# Patient Record
Sex: MALE | NOT HISPANIC OR LATINO | Employment: FULL TIME | URBAN - METROPOLITAN AREA
[De-identification: names, ages, dates, MRNs, and addresses within clinical notes are randomized per-mention and may not be internally consistent; named-entity substitution may affect disease eponyms.]

---

## 2021-12-01 ENCOUNTER — LAB (OUTPATIENT)
Dept: LAB | Facility: HOSPITAL | Age: 61
End: 2021-12-01

## 2021-12-01 ENCOUNTER — OFFICE VISIT (OUTPATIENT)
Dept: INTERNAL MEDICINE | Facility: CLINIC | Age: 61
End: 2021-12-01

## 2021-12-01 VITALS
TEMPERATURE: 98 F | OXYGEN SATURATION: 99 % | SYSTOLIC BLOOD PRESSURE: 130 MMHG | BODY MASS INDEX: 24.98 KG/M2 | HEIGHT: 72 IN | DIASTOLIC BLOOD PRESSURE: 80 MMHG | WEIGHT: 184.4 LBS | RESPIRATION RATE: 16 BRPM | HEART RATE: 68 BPM

## 2021-12-01 DIAGNOSIS — R39.89 URINARY PROBLEM: ICD-10-CM

## 2021-12-01 DIAGNOSIS — I25.2 HISTORY OF MI (MYOCARDIAL INFARCTION): ICD-10-CM

## 2021-12-01 DIAGNOSIS — Z13.89 ENCOUNTER FOR SCREENING FOR OTHER DISORDER: ICD-10-CM

## 2021-12-01 DIAGNOSIS — Z00.00 HEALTHCARE MAINTENANCE: Primary | ICD-10-CM

## 2021-12-01 LAB
25(OH)D3 SERPL-MCNC: 17 NG/ML (ref 30–100)
ALBUMIN SERPL-MCNC: 3.9 G/DL (ref 3.5–5.2)
ALBUMIN/GLOB SERPL: 1.4 G/DL
ALP SERPL-CCNC: 72 U/L (ref 39–117)
ALT SERPL W P-5'-P-CCNC: 28 U/L (ref 1–41)
ANION GAP SERPL CALCULATED.3IONS-SCNC: 8.9 MMOL/L (ref 5–15)
AST SERPL-CCNC: 25 U/L (ref 1–40)
BACTERIA UR QL AUTO: NORMAL /HPF
BILIRUB SERPL-MCNC: 0.4 MG/DL (ref 0–1.2)
BILIRUB UR QL STRIP: NEGATIVE
BUN SERPL-MCNC: 11 MG/DL (ref 8–23)
BUN/CREAT SERPL: 14.5 (ref 7–25)
CALCIUM SPEC-SCNC: 9.2 MG/DL (ref 8.6–10.5)
CHLORIDE SERPL-SCNC: 102 MMOL/L (ref 98–107)
CHOLEST SERPL-MCNC: 212 MG/DL (ref 0–200)
CLARITY UR: CLEAR
CO2 SERPL-SCNC: 28.1 MMOL/L (ref 22–29)
COLOR UR: YELLOW
CREAT SERPL-MCNC: 0.76 MG/DL (ref 0.76–1.27)
DEPRECATED RDW RBC AUTO: 39.2 FL (ref 37–54)
ERYTHROCYTE [DISTWIDTH] IN BLOOD BY AUTOMATED COUNT: 11.8 % (ref 12.3–15.4)
FOLATE SERPL-MCNC: 9.57 NG/ML (ref 4.78–24.2)
GFR SERPL CREATININE-BSD FRML MDRD: 105 ML/MIN/1.73
GFR SERPL CREATININE-BSD FRML MDRD: 127 ML/MIN/1.73
GLOBULIN UR ELPH-MCNC: 2.8 GM/DL
GLUCOSE SERPL-MCNC: 91 MG/DL (ref 65–99)
GLUCOSE UR STRIP-MCNC: NEGATIVE MG/DL
HBA1C MFR BLD: 5.7 % (ref 4.8–5.6)
HCT VFR BLD AUTO: 43.3 % (ref 37.5–51)
HCV AB SER DONR QL: NORMAL
HDLC SERPL QL: 3.53
HDLC SERPL-MCNC: 60 MG/DL (ref 40–60)
HGB BLD-MCNC: 15.2 G/DL (ref 13–17.7)
HGB UR QL STRIP.AUTO: NEGATIVE
HYALINE CASTS UR QL AUTO: NORMAL /LPF
KETONES UR QL STRIP: NEGATIVE
LDLC SERPL CALC-MCNC: 135 MG/DL (ref 0–100)
LEUKOCYTE ESTERASE UR QL STRIP.AUTO: ABNORMAL
MCH RBC QN AUTO: 32.3 PG (ref 26.6–33)
MCHC RBC AUTO-ENTMCNC: 35.1 G/DL (ref 31.5–35.7)
MCV RBC AUTO: 91.9 FL (ref 79–97)
NITRITE UR QL STRIP: NEGATIVE
PH UR STRIP.AUTO: 6.5 [PH] (ref 5–8)
PLATELET # BLD AUTO: 245 10*3/MM3 (ref 140–450)
PMV BLD AUTO: 10.4 FL (ref 6–12)
POTASSIUM SERPL-SCNC: 3.7 MMOL/L (ref 3.5–5.2)
PROT SERPL-MCNC: 6.7 G/DL (ref 6–8.5)
PROT UR QL STRIP: NEGATIVE
PSA SERPL-MCNC: 1.32 NG/ML (ref 0–4)
RBC # BLD AUTO: 4.71 10*6/MM3 (ref 4.14–5.8)
RBC # UR STRIP: NORMAL /HPF
REF LAB TEST METHOD: NORMAL
SODIUM SERPL-SCNC: 139 MMOL/L (ref 136–145)
SP GR UR STRIP: 1.01 (ref 1–1.03)
SQUAMOUS #/AREA URNS HPF: NORMAL /HPF
TRIGL SERPL-MCNC: 95 MG/DL (ref 0–150)
TSH SERPL DL<=0.05 MIU/L-ACNC: 0.76 UIU/ML (ref 0.27–4.2)
UROBILINOGEN UR QL STRIP: ABNORMAL
VIT B12 BLD-MCNC: 239 PG/ML (ref 211–946)
VLDLC SERPL-MCNC: 17 MG/DL (ref 5–40)
WBC # UR STRIP: NORMAL /HPF
WBC NRBC COR # BLD: 6.35 10*3/MM3 (ref 3.4–10.8)

## 2021-12-01 PROCEDURE — 80061 LIPID PANEL: CPT | Performed by: FAMILY MEDICINE

## 2021-12-01 PROCEDURE — 81001 URINALYSIS AUTO W/SCOPE: CPT

## 2021-12-01 PROCEDURE — 82607 VITAMIN B-12: CPT | Performed by: FAMILY MEDICINE

## 2021-12-01 PROCEDURE — 84153 ASSAY OF PSA TOTAL: CPT

## 2021-12-01 PROCEDURE — 84443 ASSAY THYROID STIM HORMONE: CPT | Performed by: FAMILY MEDICINE

## 2021-12-01 PROCEDURE — 86803 HEPATITIS C AB TEST: CPT | Performed by: FAMILY MEDICINE

## 2021-12-01 PROCEDURE — 82746 ASSAY OF FOLIC ACID SERUM: CPT | Performed by: FAMILY MEDICINE

## 2021-12-01 PROCEDURE — 82306 VITAMIN D 25 HYDROXY: CPT | Performed by: FAMILY MEDICINE

## 2021-12-01 PROCEDURE — 80053 COMPREHEN METABOLIC PANEL: CPT | Performed by: FAMILY MEDICINE

## 2021-12-01 PROCEDURE — 85027 COMPLETE CBC AUTOMATED: CPT | Performed by: FAMILY MEDICINE

## 2021-12-01 PROCEDURE — 83036 HEMOGLOBIN GLYCOSYLATED A1C: CPT | Performed by: FAMILY MEDICINE

## 2021-12-01 PROCEDURE — 99213 OFFICE O/P EST LOW 20 MIN: CPT | Performed by: FAMILY MEDICINE

## 2021-12-01 PROCEDURE — 93000 ELECTROCARDIOGRAM COMPLETE: CPT | Performed by: FAMILY MEDICINE

## 2021-12-01 PROCEDURE — 99386 PREV VISIT NEW AGE 40-64: CPT | Performed by: FAMILY MEDICINE

## 2021-12-01 PROCEDURE — 36415 COLL VENOUS BLD VENIPUNCTURE: CPT | Performed by: FAMILY MEDICINE

## 2021-12-01 RX ORDER — TAMSULOSIN HYDROCHLORIDE 0.4 MG/1
1 CAPSULE ORAL DAILY
COMMUNITY
End: 2022-01-21 | Stop reason: SDUPTHER

## 2021-12-01 NOTE — PROGRESS NOTES
Patient Information  Tripp Melchor                                                                                          [address] [facility] [address]      Chief Complaint:   Chief Complaint   Patient presents with   • [facility] Care     Heart attack concerns / urine issues/ dark spots on face/ nail fungus   • Abnormal ECG          History of Present Illness:  Pt is here for abnormal ECG approx 3-4 months ago. He reports he was informed his ECG showed a possible MI in his hx. however pt denies any cardiac hx. He does have family hx of CVD in his father. Pt denies any cp, dyspnea, palpitations, lightheadedness, dizziness, n/v. He does have 10-yr hx of smoking. He does drink alcohol.   Pt is also here to [facility]. Not requesting any refills. No side effects reported. No further concerns/complaints.    Review of Systems   Constitutional: Negative.   HENT: Negative.    Eyes: Negative.    Cardiovascular: Negative.    Respiratory: Negative.    Endocrine: Negative.    Hematologic/Lymphatic: Negative.    Skin: Negative.    Musculoskeletal: Negative.    Gastrointestinal: Negative.    Genitourinary: Negative.    Neurological: Negative.    Psychiatric/Behavioral: Negative.    Allergic/Immunologic: Negative.        Active Problems:    There is no problem list on file for this patient.        Past Medical History:   Diagnosis Date   • Heart attack (HCC)          History reviewed. No pertinent surgical history.      No Known Allergies        Current Outpatient Medications:   •  Diclofenac Sodium (VOLTAREN) 1 % gel gel, Apply 4 g topically to the appropriate area as directed 4 (Four) Times a Day As Needed., Disp: , Rfl:   •  tamsulosin (FLOMAX) 0.4 MG capsule 24 hr capsule, Take 1 capsule by mouth Daily., Disp: , Rfl:       Family History   Problem Relation Age of Onset   • Heart disease Father    • Heart attack Father    • Glaucoma Father          Social History     Socioeconomic History   • Marital status:  "Single   Tobacco Use   • Smoking status: Former Smoker   • Smokeless tobacco: Never Used   Vaping Use   • Vaping Use: Never used   Substance and Sexual Activity   • Alcohol use: Yes   • Drug use: Defer   • Sexual activity: Yes     Partners: Female         Physical Exam  Constitutional:       Appearance: Normal appearance. He is normal weight.   Cardiovascular:      Rate and Rhythm: Normal rate and regular rhythm.      Pulses: Normal pulses.      Heart sounds: Normal heart sounds.   Pulmonary:      Effort: Pulmonary effort is normal.      Breath sounds: Normal breath sounds.   Abdominal:      General: Abdomen is flat. Bowel sounds are normal. There is no distension.      Palpations: Abdomen is soft. There is no mass.      Tenderness: There is no abdominal tenderness.   Skin:     General: Skin is warm.      Findings: No rash.   Neurological:      General: No focal deficit present.      Mental Status: He is alert and oriented to person, place, and time. Mental status is at baseline.   Psychiatric:         Mood and Affect: Mood normal.         Behavior: Behavior normal.         Thought Content: Thought content normal.         Judgment: Judgment normal.          Vitals:    12/01/21 1150   BP: 130/80   Pulse: 68   Resp: 16   Temp: 98 °F (36.7 °C)   SpO2: 99%   Weight: 83.6 kg (184 lb 6.4 oz)   Height: 182.9 cm (72\")       Body mass index is 25.01 kg/m².       Lab/other results:        Assessment/Plan:    Diagnoses and all orders for this visit:    1. Healthcare maintenance (Primary)  -     CBC (No Diff)  -     TSH  -     Vitamin B12  -     Folate  -     Vitamin D 25 Hydroxy  -     Lipid Panel With / Chol / HDL Ratio  -     Hemoglobin A1c  -     Hepatitis C Antibody  -     Comprehensive Metabolic Panel    2. Encounter for screening for other disorder  -     CBC (No Diff)  -     TSH  -     Vitamin B12  -     Folate  -     Vitamin D 25 Hydroxy  -     Lipid Panel With / Chol / HDL Ratio  -     Hemoglobin A1c  -     Hepatitis " C Antibody  -     Comprehensive Metabolic Panel    3. History of MI (myocardial infarction)  -     Adult Transthoracic Echo Complete W/ Cont if Necessary Per Protocol  -     ECG 12 Lead  -     Holter monitor - 48 hour; Future  -     Treadmill Stress Test; Future  -     CT Cardiac Calcium Score Without Dye; Future    4. Urinary problem  -     Urinalysis With Culture If Indicated -; Future  -     PSA Diagnostic; Future    Other orders  -     Cancel: Tdap Vaccine Greater Than or Equal To 8yo IM      Abnormal ECG--today ECG was NSR, with some ST changes however unable to compare to prior. Pt is asymptomatic. F/u cardiac w/u as pt has some risk factors + familial hx of CVD.       HM--pt will return later to update his vaccines. Colon cancer screening UTD. F/u labs. F/u in 1 yr routine annual visit.          Procedures

## 2021-12-07 RX ORDER — ERGOCALCIFEROL 1.25 MG/1
50000 CAPSULE ORAL WEEKLY
Qty: 12 CAPSULE | Refills: 4 | Status: SHIPPED | OUTPATIENT
Start: 2021-12-07

## 2021-12-15 ENCOUNTER — APPOINTMENT (OUTPATIENT)
Dept: CARDIOLOGY | Facility: HOSPITAL | Age: 61
End: 2021-12-15

## 2021-12-16 ENCOUNTER — HOSPITAL ENCOUNTER (OUTPATIENT)
Dept: CT IMAGING | Facility: HOSPITAL | Age: 61
Discharge: HOME | End: 2021-12-16
Admitting: FAMILY MEDICINE

## 2021-12-16 DIAGNOSIS — I25.2 HISTORY OF MI (MYOCARDIAL INFARCTION): ICD-10-CM

## 2021-12-16 PROCEDURE — 75571 CT HRT W/O DYE W/CA TEST: CPT

## 2021-12-20 RX ORDER — ATORVASTATIN CALCIUM 80 MG/1
80 TABLET, FILM COATED ORAL DAILY
Qty: 90 TABLET | Refills: 4 | Status: SHIPPED | OUTPATIENT
Start: 2021-12-20

## 2021-12-22 ENCOUNTER — HOSPITAL ENCOUNTER (OUTPATIENT)
Dept: CARDIOLOGY | Facility: HOSPITAL | Age: 61
Discharge: HOME | End: 2021-12-22
Admitting: FAMILY MEDICINE

## 2021-12-22 ENCOUNTER — HOSPITAL ENCOUNTER (OUTPATIENT)
Dept: CARDIOLOGY | Facility: HOSPITAL | Age: 61
Discharge: HOME | End: 2021-12-22

## 2021-12-22 VITALS
SYSTOLIC BLOOD PRESSURE: 107 MMHG | DIASTOLIC BLOOD PRESSURE: 54 MMHG | WEIGHT: 184 LBS | HEIGHT: 72 IN | BODY MASS INDEX: 24.92 KG/M2

## 2021-12-22 DIAGNOSIS — M79.601 RIGHT ARM PAIN: Primary | ICD-10-CM

## 2021-12-22 DIAGNOSIS — I25.2 HISTORY OF MI (MYOCARDIAL INFARCTION): ICD-10-CM

## 2021-12-22 LAB
AORTIC DIMENSIONLESS INDEX: 0.7 (DI)
ASCENDING AORTA: 3.4 CM
BH CV ECHO MEAS - ACS: 2.2 CM
BH CV ECHO MEAS - AO MAX PG (FULL): 1.1 MMHG
BH CV ECHO MEAS - AO MAX PG: 3.4 MMHG
BH CV ECHO MEAS - AO MEAN PG (FULL): 0.58 MMHG
BH CV ECHO MEAS - AO MEAN PG: 1.8 MMHG
BH CV ECHO MEAS - AO ROOT AREA (BSA CORRECTED): 1.7
BH CV ECHO MEAS - AO ROOT AREA: 9.6 CM^2
BH CV ECHO MEAS - AO ROOT DIAM: 3.5 CM
BH CV ECHO MEAS - AO V2 MAX: 91.7 CM/SEC
BH CV ECHO MEAS - AO V2 MEAN: 65.8 CM/SEC
BH CV ECHO MEAS - AO V2 VTI: 20.7 CM
BH CV ECHO MEAS - ASC AORTA: 3.4 CM
BH CV ECHO MEAS - AVA(I,A): 3.4 CM^2
BH CV ECHO MEAS - AVA(I,D): 3.4 CM^2
BH CV ECHO MEAS - AVA(V,A): 3.7 CM^2
BH CV ECHO MEAS - AVA(V,D): 3.7 CM^2
BH CV ECHO MEAS - BSA(HAYCOCK): 2.1 M^2
BH CV ECHO MEAS - BSA: 2.1 M^2
BH CV ECHO MEAS - BZI_BMI: 25 KILOGRAMS/M^2
BH CV ECHO MEAS - BZI_METRIC_HEIGHT: 182.9 CM
BH CV ECHO MEAS - BZI_METRIC_WEIGHT: 83.5 KG
BH CV ECHO MEAS - CONTRAST EF 4CH: 38 CM2
BH CV ECHO MEAS - EDV(CUBED): 180.5 ML
BH CV ECHO MEAS - EDV(MOD-SP2): 148 ML
BH CV ECHO MEAS - EDV(MOD-SP4): 153 ML
BH CV ECHO MEAS - EDV(TEICH): 156.9 ML
BH CV ECHO MEAS - EF(CUBED): 21.1 %
BH CV ECHO MEAS - EF(MOD-BP): 37.5 %
BH CV ECHO MEAS - EF(MOD-SP2): 33.8 %
BH CV ECHO MEAS - EF(MOD-SP4): 38.6 %
BH CV ECHO MEAS - EF(TEICH): 16.7 %
BH CV ECHO MEAS - ESV(CUBED): 142.4 ML
BH CV ECHO MEAS - ESV(MOD-SP2): 98 ML
BH CV ECHO MEAS - ESV(MOD-SP4): 94 ML
BH CV ECHO MEAS - ESV(TEICH): 130.8 ML
BH CV ECHO MEAS - FS: 7.6 %
BH CV ECHO MEAS - IVS/LVPW: 0.98
BH CV ECHO MEAS - IVSD: 0.98 CM
BH CV ECHO MEAS - LAT PEAK E' VEL: 11.3 CM/SEC
BH CV ECHO MEAS - LV DIASTOLIC VOL/BSA (35-75): 74.4 ML/M^2
BH CV ECHO MEAS - LV MASS(C)D: 219.5 GRAMS
BH CV ECHO MEAS - LV MASS(C)DI: 106.7 GRAMS/M^2
BH CV ECHO MEAS - LV MAX PG: 2.2 MMHG
BH CV ECHO MEAS - LV MEAN PG: 1.2 MMHG
BH CV ECHO MEAS - LV SYSTOLIC VOL/BSA (12-30): 45.7 ML/M^2
BH CV ECHO MEAS - LV V1 MAX: 74.7 CM/SEC
BH CV ECHO MEAS - LV V1 MEAN: 51.6 CM/SEC
BH CV ECHO MEAS - LV V1 VTI: 15.3 CM
BH CV ECHO MEAS - LVIDD: 5.7 CM
BH CV ECHO MEAS - LVIDS: 5.2 CM
BH CV ECHO MEAS - LVLD AP2: 8.9 CM
BH CV ECHO MEAS - LVLD AP4: 8.5 CM
BH CV ECHO MEAS - LVLS AP2: 7.6 CM
BH CV ECHO MEAS - LVLS AP4: 7.4 CM
BH CV ECHO MEAS - LVOT AREA (M): 4.5 CM^2
BH CV ECHO MEAS - LVOT AREA: 4.6 CM^2
BH CV ECHO MEAS - LVOT DIAM: 2.4 CM
BH CV ECHO MEAS - LVPWD: 1 CM
BH CV ECHO MEAS - MED PEAK E' VEL: 7.3 CM/SEC
BH CV ECHO MEAS - MV A DUR: 0.14 SEC
BH CV ECHO MEAS - MV A MAX VEL: 56.9 CM/SEC
BH CV ECHO MEAS - MV DEC SLOPE: 297.9 CM/SEC^2
BH CV ECHO MEAS - MV DEC TIME: 180 SEC
BH CV ECHO MEAS - MV E MAX VEL: 49.5 CM/SEC
BH CV ECHO MEAS - MV E/A: 0.87
BH CV ECHO MEAS - MV MAX PG: 1.4 MMHG
BH CV ECHO MEAS - MV MEAN PG: 0.61 MMHG
BH CV ECHO MEAS - MV P1/2T MAX VEL: 64.6 CM/SEC
BH CV ECHO MEAS - MV P1/2T: 63.5 MSEC
BH CV ECHO MEAS - MV V2 MAX: 59.7 CM/SEC
BH CV ECHO MEAS - MV V2 MEAN: 36.2 CM/SEC
BH CV ECHO MEAS - MV V2 VTI: 18.9 CM
BH CV ECHO MEAS - MVA P1/2T LCG: 3.4 CM^2
BH CV ECHO MEAS - MVA(P1/2T): 3.5 CM^2
BH CV ECHO MEAS - MVA(VTI): 3.7 CM^2
BH CV ECHO MEAS - PA ACC TIME: 0.06 SEC
BH CV ECHO MEAS - PA MAX PG (FULL): 0.28 MMHG
BH CV ECHO MEAS - PA MAX PG: 1.8 MMHG
BH CV ECHO MEAS - PA PR(ACCEL): 52.9 MMHG
BH CV ECHO MEAS - PA V2 MAX: 67.9 CM/SEC
BH CV ECHO MEAS - PULM A REVS DUR: 0.16 SEC
BH CV ECHO MEAS - PULM A REVS VEL: 18.2 CM/SEC
BH CV ECHO MEAS - PULM DIAS VEL: 59.6 CM/SEC
BH CV ECHO MEAS - PULM S/D: 1.1
BH CV ECHO MEAS - PULM SYS VEL: 66.8 CM/SEC
BH CV ECHO MEAS - PVA(V,A): 3.3 CM^2
BH CV ECHO MEAS - PVA(V,D): 3.3 CM^2
BH CV ECHO MEAS - QP/QS: 0.81
BH CV ECHO MEAS - RAP SYSTOLE: 15 MMHG
BH CV ECHO MEAS - RV MAX PG: 1.6 MMHG
BH CV ECHO MEAS - RV MEAN PG: 0.93 MMHG
BH CV ECHO MEAS - RV V1 MAX: 62.6 CM/SEC
BH CV ECHO MEAS - RV V1 MEAN: 46 CM/SEC
BH CV ECHO MEAS - RV V1 VTI: 15.7 CM
BH CV ECHO MEAS - RVOT AREA: 3.6 CM^2
BH CV ECHO MEAS - RVOT DIAM: 2.1 CM
BH CV ECHO MEAS - RVSP: 30.7 MMHG
BH CV ECHO MEAS - SI(AO): 96.6 ML/M^2
BH CV ECHO MEAS - SI(CUBED): 18.5 ML/M^2
BH CV ECHO MEAS - SI(LVOT): 34.1 ML/M^2
BH CV ECHO MEAS - SI(MOD-SP2): 24.3 ML/M^2
BH CV ECHO MEAS - SI(MOD-SP4): 28.7 ML/M^2
BH CV ECHO MEAS - SI(TEICH): 12.7 ML/M^2
BH CV ECHO MEAS - SUP REN AO DIAM: 1.8 CM
BH CV ECHO MEAS - SV(AO): 198.7 ML
BH CV ECHO MEAS - SV(CUBED): 38.1 ML
BH CV ECHO MEAS - SV(LVOT): 70.2 ML
BH CV ECHO MEAS - SV(MOD-SP2): 50 ML
BH CV ECHO MEAS - SV(MOD-SP4): 59 ML
BH CV ECHO MEAS - SV(RVOT): 56.6 ML
BH CV ECHO MEAS - SV(TEICH): 26.1 ML
BH CV ECHO MEAS - TAPSE (>1.6): 1.9 CM
BH CV ECHO MEAS - TR MAX VEL: 197.9 CM/SEC
BH CV ECHO MEASUREMENTS AVERAGE E/E' RATIO: 5.32
BH CV VAS BP RIGHT ARM: NORMAL MMHG
BH CV XLRA - RV BASE: 3.7 CM
BH CV XLRA - RV LENGTH: 7.5 CM
BH CV XLRA - RV MID: 3.5 CM
BH CV XLRA - TDI S': 7.4 CM/SEC
LEFT ATRIUM VOLUME INDEX: 41 ML/M2
MAXIMAL PREDICTED HEART RATE: 160 BPM
MAXIMAL PREDICTED HEART RATE: 160 BPM
SINUS: 3.4 CM
STJ: 3.1 CM
STRESS TARGET HR: 136 BPM
STRESS TARGET HR: 136 BPM

## 2021-12-22 PROCEDURE — 25010000002 PERFLUTREN (DEFINITY) 8.476 MG IN SODIUM CHLORIDE (PF) 0.9 % 10 ML INJECTION: Performed by: FAMILY MEDICINE

## 2021-12-22 PROCEDURE — 93306 TTE W/DOPPLER COMPLETE: CPT | Performed by: INTERNAL MEDICINE

## 2021-12-22 PROCEDURE — 93226 XTRNL ECG REC<48 HR SCAN A/R: CPT

## 2021-12-22 PROCEDURE — 93306 TTE W/DOPPLER COMPLETE: CPT

## 2021-12-22 PROCEDURE — 93225 XTRNL ECG REC<48 HRS REC: CPT

## 2021-12-22 RX ADMIN — SODIUM CHLORIDE 1.5 ML: 9 INJECTION INTRAMUSCULAR; INTRAVENOUS; SUBCUTANEOUS at 12:49

## 2021-12-24 ENCOUNTER — HOSPITAL ENCOUNTER (OUTPATIENT)
Dept: GENERAL RADIOLOGY | Facility: HOSPITAL | Age: 61
Discharge: HOME | End: 2021-12-24

## 2021-12-24 PROCEDURE — 73060 X-RAY EXAM OF HUMERUS: CPT

## 2021-12-24 PROCEDURE — 73090 X-RAY EXAM OF FOREARM: CPT

## 2021-12-27 ENCOUNTER — OFFICE VISIT (OUTPATIENT)
Dept: INTERNAL MEDICINE | Facility: CLINIC | Age: 61
End: 2021-12-27

## 2021-12-27 VITALS
OXYGEN SATURATION: 99 % | BODY MASS INDEX: 25.81 KG/M2 | TEMPERATURE: 98 F | SYSTOLIC BLOOD PRESSURE: 119 MMHG | DIASTOLIC BLOOD PRESSURE: 63 MMHG | HEIGHT: 72 IN | WEIGHT: 190.6 LBS | RESPIRATION RATE: 16 BRPM | HEART RATE: 64 BPM

## 2021-12-27 DIAGNOSIS — I50.21 CHF (CONGESTIVE HEART FAILURE), NYHA CLASS II, ACUTE, SYSTOLIC (HCC): ICD-10-CM

## 2021-12-27 DIAGNOSIS — L81.9 DEPIGMENTATION OF SKIN: ICD-10-CM

## 2021-12-27 DIAGNOSIS — M77.11 RIGHT LATERAL EPICONDYLITIS: ICD-10-CM

## 2021-12-27 DIAGNOSIS — I25.10 CORONARY ARTERY DISEASE INVOLVING NATIVE CORONARY ARTERY OF NATIVE HEART WITHOUT ANGINA PECTORIS: Primary | ICD-10-CM

## 2021-12-27 DIAGNOSIS — R06.09 EXERTIONAL DYSPNEA: ICD-10-CM

## 2021-12-27 LAB
MAXIMAL PREDICTED HEART RATE: 160 BPM
STRESS TARGET HR: 136 BPM

## 2021-12-27 PROCEDURE — 93227 XTRNL ECG REC<48 HR R&I: CPT | Performed by: INTERNAL MEDICINE

## 2021-12-27 PROCEDURE — 99214 OFFICE O/P EST MOD 30 MIN: CPT | Performed by: FAMILY MEDICINE

## 2021-12-27 RX ORDER — ENALAPRIL MALEATE 2.5 MG/1
2.5 TABLET ORAL DAILY
COMMUNITY

## 2021-12-27 RX ORDER — TAZAROTENE 1 MG/G
1 CREAM TOPICAL NIGHTLY
Qty: 30 G | Refills: 3 | Status: SHIPPED | OUTPATIENT
Start: 2021-12-27

## 2021-12-27 RX ORDER — DEXAMETHASONE 4 MG/1
4 TABLET ORAL 2 TIMES DAILY WITH MEALS
Qty: 20 TABLET | Refills: 0 | Status: SHIPPED | OUTPATIENT
Start: 2021-12-27 | End: 2022-02-21

## 2021-12-27 RX ORDER — CYCLOBENZAPRINE HCL 5 MG
5 TABLET ORAL 2 TIMES DAILY PRN
Qty: 20 TABLET | Refills: 1 | Status: SHIPPED | OUTPATIENT
Start: 2021-12-27 | End: 2022-02-21

## 2021-12-28 ENCOUNTER — OFFICE VISIT (OUTPATIENT)
Dept: CARDIOLOGY | Facility: CLINIC | Age: 61
End: 2021-12-28

## 2021-12-28 VITALS
HEIGHT: 72 IN | DIASTOLIC BLOOD PRESSURE: 64 MMHG | OXYGEN SATURATION: 99 % | BODY MASS INDEX: 25.87 KG/M2 | WEIGHT: 191 LBS | HEART RATE: 64 BPM | SYSTOLIC BLOOD PRESSURE: 120 MMHG

## 2021-12-28 DIAGNOSIS — I25.10 CORONARY ARTERY DISEASE INVOLVING NATIVE CORONARY ARTERY OF NATIVE HEART WITHOUT ANGINA PECTORIS: Primary | ICD-10-CM

## 2021-12-28 DIAGNOSIS — I25.5 ISCHEMIC CARDIOMYOPATHY: ICD-10-CM

## 2021-12-28 DIAGNOSIS — R94.31 ABNORMAL EKG: ICD-10-CM

## 2021-12-28 PROBLEM — M77.11 LATERAL EPICONDYLITIS OF RIGHT ELBOW: Chronic | Status: ACTIVE | Noted: 2021-12-28

## 2021-12-28 PROBLEM — I50.21: Status: RESOLVED | Noted: 2021-12-28 | Resolved: 2021-12-28

## 2021-12-28 PROBLEM — I50.21: Status: ACTIVE | Noted: 2021-12-28

## 2021-12-28 PROCEDURE — 93000 ELECTROCARDIOGRAM COMPLETE: CPT | Performed by: INTERNAL MEDICINE

## 2021-12-28 PROCEDURE — 99204 OFFICE O/P NEW MOD 45 MIN: CPT | Performed by: INTERNAL MEDICINE

## 2021-12-28 RX ORDER — NITROGLYCERIN 0.4 MG/1
0.4 TABLET SUBLINGUAL
Qty: 20 TABLET | Refills: 2 | Status: SHIPPED | OUTPATIENT
Start: 2021-12-28

## 2021-12-28 RX ORDER — CARVEDILOL 3.12 MG/1
3.12 TABLET ORAL 2 TIMES DAILY WITH MEALS
COMMUNITY

## 2021-12-28 NOTE — PROGRESS NOTES
12/27/2021    Patient Information  Tripp Melchor                                                                                          [address] [facility] [address]      Chief Complaint:   Chief Complaint   Patient presents with   • Right arm pain   • Shortness of Breath   • Coronary Artery Disease   • Congestive Heart Failure          History of Present Illness:  Pt is here for right elbow pain x2 months. Assoc with some paresthesias, pain along the right lat epicondyle. He has een doing increased repetitive movements doing renovations at home. He has not attempted to treat this pain.    CAD/CHF--pt has not picked up his carvedilol, statin, asa, enalapril yet. He c/o some exertional dyspnea which is new. No cp, palpitations, lightheadedness, dizziness, cough, fevers, weight changes, edema, orthopnea, PND. nuc stress  Test and cards consult pending.    No further concerns/complaints.    Review of Systems   Constitutional: Negative.   HENT: Negative.    Eyes: Negative.    Cardiovascular: Positive for dyspnea on exertion. Negative for chest pain and irregular heartbeat.   Respiratory: Negative for shortness of breath.    Endocrine: Negative.    Hematologic/Lymphatic: Negative.    Skin: Negative.    Musculoskeletal: Positive for joint pain.   Gastrointestinal: Negative.    Genitourinary: Negative.    Neurological: Negative.    Psychiatric/Behavioral: Negative.    Allergic/Immunologic: Negative.        Active Problems:    Patient Active Problem List   Diagnosis   • Coronary artery disease involving native coronary artery   • Lateral epicondylitis of right elbow   • CHF (congestive heart failure), NYHA class II, acute, systolic (HCC)         Past Medical History:   Diagnosis Date   • Heart attack (HCC)          History reviewed. No pertinent surgical history.      No Known Allergies        Current Outpatient Medications:   •  atorvastatin (Lipitor) 80 MG tablet, Take 1 tablet by mouth Daily.,  Disp: 90 tablet, Rfl: 4  •  carvedilol (COREG) 3.125 MG tablet, Take 3.125 mg by mouth 2 (Two) Times a Day With Meals., Disp: , Rfl:   •  Diclofenac Sodium (VOLTAREN) 1 % gel gel, Apply 4 g topically to the appropriate area as directed 4 (Four) Times a Day As Needed., Disp: , Rfl:   •  enalapril (VASOTEC) 2.5 MG tablet, Take 2.5 mg by mouth Daily., Disp: , Rfl:   •  tamsulosin (FLOMAX) 0.4 MG capsule 24 hr capsule, Take 1 capsule by mouth Daily., Disp: , Rfl:   •  vitamin D (ERGOCALCIFEROL) 1.25 MG (97706 UT) capsule capsule, Take 1 capsule by mouth 1 (One) Time Per Week., Disp: 12 capsule, Rfl: 4  •  cyclobenzaprine (FLEXERIL) 5 MG tablet, Take 1 tablet by mouth 2 (Two) Times a Day As Needed for Muscle Spasms., Disp: 20 tablet, Rfl: 1  •  dexamethasone (DECADRON) 4 MG tablet, Take 1 tablet by mouth 2 (Two) Times a Day With Meals., Disp: 20 tablet, Rfl: 0  •  nitroglycerin (Nitrostat) 0.4 MG SL tablet, Place 1 tablet under the tongue Every 5 (Five) Minutes As Needed for Chest Pain (up to 3 doses, and call 911). Take no more than 3 doses in 15 minutes., Disp: 20 tablet, Rfl: 2  •  tazarotene (Tazorac) 0.1 % cream, Apply 1 application topically to the appropriate area as directed Every Night., Disp: 30 g, Rfl: 3      Family History   Problem Relation Age of Onset   • Heart disease Father    • Heart attack Father    • Glaucoma Father          Social History     Socioeconomic History   • Marital status: Single   Tobacco Use   • Smoking status: Former Smoker   • Smokeless tobacco: Never Used   Vaping Use   • Vaping Use: Never used   Substance and Sexual Activity   • Alcohol use: Yes   • Drug use: Defer   • Sexual activity: Yes     Partners: Female         Physical Exam  Constitutional:       Appearance: Normal appearance. He is normal weight.   Cardiovascular:      Rate and Rhythm: Normal rate and regular rhythm.      Pulses: Normal pulses.      Heart sounds: Normal heart sounds.   Pulmonary:      Effort: Pulmonary  "effort is normal.      Breath sounds: Normal breath sounds.   Musculoskeletal:         General: Tenderness present. No swelling. Normal range of motion.      Comments: Right elbow: tenderness along right lat epicondyle, no effusion, FROM   Skin:     General: Skin is warm.      Findings: No rash.      Comments: depigmentation on face, diffuse   Neurological:      General: No focal deficit present.      Mental Status: He is alert and oriented to person, place, and time. Mental status is at baseline.   Psychiatric:         Mood and Affect: Mood normal.         Behavior: Behavior normal.         Thought Content: Thought content normal.         Judgment: Judgment normal.          Vitals:    12/27/21 0805   BP: 119/63   Pulse: 64   Resp: 16   Temp: 98 °F (36.7 °C)   SpO2: 99%   Weight: 86.5 kg (190 lb 9.6 oz)   Height: 182.9 cm (72\")       Body mass index is 25.85 kg/m².       Lab/other results:        Assessment/Plan:    Diagnoses and all orders for this visit:    1. Coronary artery disease involving native coronary artery of native heart without angina pectoris (Primary)  -     Ambulatory Referral to Cardiology  -     Stress Test With Myocardial Perfusion - One Day; Future    2. Right lateral epicondylitis  -     Ambulatory Referral to Physical Therapy Evaluate and treat, Ortho; Heat, Electrotherapy; (any heat); Soft Tissue Mobilizaton; Stretching, Strengthening, ROM    3. Exertional dyspnea    4. CHF (congestive heart failure), NYHA class II, acute, systolic (HCC)    5. Depigmentation of skin    Other orders  -     dexamethasone (DECADRON) 4 MG tablet; Take 1 tablet by mouth 2 (Two) Times a Day With Meals.  Dispense: 20 tablet; Refill: 0  -     cyclobenzaprine (FLEXERIL) 5 MG tablet; Take 1 tablet by mouth 2 (Two) Times a Day As Needed for Muscle Spasms.  Dispense: 20 tablet; Refill: 1  -     tazarotene (Tazorac) 0.1 % cream; Apply 1 application topically to the appropriate area as directed Every Night.  Dispense: 30 " g; Refill: 3  -     nitroglycerin (Nitrostat) 0.4 MG SL tablet; Place 1 tablet under the tongue Every 5 (Five) Minutes As Needed for Chest Pain (up to 3 doses, and call 911). Take no more than 3 doses in 15 minutes.  Dispense: 20 tablet; Refill: 2         CAD/exertional dyspnea/CHF systolic--started enalapril , carvedilol, lipitor 80mg, asa 3 days ago. Pt still has not started these meds. He was strongly advised to do so. EF is 45%, with left wall hypokinesis. CT calcium score shows moderate risk,holter was normal, nuc stress test & cards consult is still pending.     Right lateral epicondylitis--steroid burst, tylenol, avoid repetitive movements, muscle relaxers, PT referral. F/u in 10 days if no improvement. Elbow counterforce brace.     Depigmentation, chronic--tazorac.    Procedures

## 2021-12-28 NOTE — PROGRESS NOTES
"      CARDIOLOGY    Bailey Paul MD    ENCOUNTER DATE:  12/28/2021    Tripp Melchor / 61 y.o. / male        CHIEF COMPLAINT / REASON FOR OFFICE VISIT     Coronary Artery Disease and Establish Care      HISTORY OF PRESENT ILLNESS       HPI    Tripp Melchor is a 61 y.o. male     This is a gentleman who said in approximately 2019 he was working in [place] and was told that he had an ejection fraction around 45%. Because of work and some insurance reasons, he did not have any further testing at that time. More recently, he had an echocardiogram here in our office on 12/22/2021 putting his ejection fraction to be about 40-45% with mild to moderate left ventricular enlargement and primary hypokinetic in the inferior segments. A nuclear stress test has been ordered.     He says he feels well. He denies palpitations, shortness of breath, edema, lightheadedness, chest pain, or fatigue. He does not exercise on a regular basis or does not have an active job. His father had a heart attack in his 70's.         REVIEW OF SYSTEMS     Review of Systems   Constitutional: Negative for chills, fever, weight gain and weight loss.   Cardiovascular: Negative for leg swelling.   Respiratory: Negative for cough, snoring and wheezing.    Hematologic/Lymphatic: Negative for bleeding problem. Does not bruise/bleed easily.   Skin: Negative for color change.   Musculoskeletal: Negative for falls, joint pain and myalgias.   Gastrointestinal: Negative for melena.   Genitourinary: Negative for hematuria.   Neurological: Negative for excessive daytime sleepiness.   Psychiatric/Behavioral: Negative for depression. The patient is not nervous/anxious.          VITAL SIGNS     Visit Vitals  /64 (BP Location: Left arm, Patient Position: Sitting)   Pulse 64   Ht 182.9 cm (72\")   Wt 86.6 kg (191 lb)   SpO2 99%   BMI 25.90 kg/m²         Wt Readings from Last 3 Encounters:   12/28/21 86.6 kg (191 lb)   12/27/21 86.5 kg (190 lb 9.6 oz)   12/22/21 " 83.5 kg (184 lb)     Body mass index is 25.9 kg/m².      PHYSICAL EXAMINATION     Constitutional:       General: Not in acute distress.  Neck:      Vascular: No carotid bruit or JVD.   Pulmonary:      Effort: Pulmonary effort is normal.      Breath sounds: Normal breath sounds.   Cardiovascular:      Normal rate. Regular rhythm.      Murmurs: There is no murmur.   Psychiatric:         Mood and Affect: Mood and affect normal.           REVIEWED DATA       ECG 12 Lead    Date/Time: 12/28/2021 10:34 AM  Performed by: Bailey Paul MD  Authorized by: Bailey Paul MD   Previous ECG: no previous ECG available  Rhythm: sinus rhythm  BPM: 64  Conduction: conduction normal  Q waves: III and aVF    ST Segments: ST segments normal  Other findings: non-specific ST-T wave changes              Lipid Panel    Lipid Panel 12/1/21   Total Cholesterol 212 (A)   Triglycerides 95   HDL Cholesterol 60   VLDL Cholesterol 17   LDL Cholesterol  135 (A)   (A) Abnormal value              Lab Results   Component Value Date    GLUCOSE 91 12/01/2021    BUN 11 12/01/2021    CREATININE 0.76 12/01/2021    EGFRIFNONA 105 12/01/2021    EGFRIFAFRI 127 12/01/2021    BCR 14.5 12/01/2021    K 3.7 12/01/2021    CO2 28.1 12/01/2021    CALCIUM 9.2 12/01/2021    ALBUMIN 3.90 12/01/2021    AST 25 12/01/2021    ALT 28 12/01/2021       ASSESSMENT & PLAN      Diagnosis Plan   1. Coronary artery disease involving native coronary artery of native heart without angina pectoris     2. Ischemic cardiomyopathy     3. Abnormal EKG         1.  Cardiomyopathy presumed to be ischemic given focal wall motion abnormalities and EKG suggestive of an inferior wall myocardial infarction.  Ejection fraction 40 to 45% by echo December 2021 which is consistent with what he recalls being told when this was first evaluated in [place] in 2019.  He is euvolemic on exam and NYHA class I.  I agree with a nuclear stress test.  He does not have a lot of symptoms to suggest active  ischemia so if his stress test is relatively benign then I would proceed with medical management.  He has been given a prescription for enalapril and carvedilol but has not yet started taking them.  I explained the reasons for these medications.  We talked about the possibility of his blood pressure getting low and that he needs to take his time when he changes positions.  We will have him come see [facility] in 2 weeks to see how he is tolerating these medications.  Next number coronary artery disease with possible history of a myocardial infarction in the past with inferior wall motion abnormalities and Q waves on his EKG.  Was never symptomatic.  Has never had a heart catheterization.  He has been started on high-dose atorvastatin.  I reviewed his lipid panel and his lipids really are not bad but I think it is reasonable to try high-dose atorvastatin.  If he does not tolerate it I would cut back to 40 mg a day.  Next number abnormal EKG with nonspecific ST changes and inferior Q waves.    He will follow up with [facility] in 2 weeks and I will see him back after that        Orders Placed This Encounter   Procedures   • ECG 12 Lead     This order was created via procedure documentation     Order Specific Question:   Release to patient     Answer:   Immediate           MEDICATIONS         Discharge Medications          Accurate as of December 28, 2021 10:40 AM. If you have any questions, ask your nurse or doctor.            Continue These Medications      Instructions Start Date   atorvastatin 80 MG tablet  Commonly known as: Lipitor   80 mg, Oral, Daily      carvedilol 3.125 MG tablet  Commonly known as: COREG   3.125 mg, Oral, 2 Times Daily With Meals      cyclobenzaprine 5 MG tablet  Commonly known as: FLEXERIL   5 mg, Oral, 2 Times Daily PRN      dexamethasone 4 MG tablet  Commonly known as: DECADRON   4 mg, Oral, 2 Times Daily With Meals      Diclofenac Sodium 1 % gel gel  Commonly known as: VOLTAREN   4 g, Topical, 4  Times Daily PRN      enalapril 2.5 MG tablet  Commonly known as: VASOTEC   2.5 mg, Oral, Daily      nitroglycerin 0.4 MG SL tablet  Commonly known as: Nitrostat   0.4 mg, Sublingual, Every 5 Minutes PRN, Take no more than 3 doses in 15 minutes.      tamsulosin 0.4 MG capsule 24 hr capsule  Commonly known as: FLOMAX   1 capsule, Oral, Daily      tazarotene 0.1 % cream  Commonly known as: Tazorac   1 application, Topical, Nightly      vitamin D 1.25 MG (68692 UT) capsule capsule  Commonly known as: ERGOCALCIFEROL   50,000 Units, Oral, Weekly               Bailey Paul MD  12/28/21  10:40 EST    **Maria D Disclaimer:   Much of this encounter note is an electronic transcription/translation of spoken language to printed text. The electronic translation of spoken language may permit erroneous, or at times, nonsensical words or phrases to be inadvertently transcribed. Although I have reviewed the note for such errors, some may still exist.

## 2022-01-21 RX ORDER — TAMSULOSIN HYDROCHLORIDE 0.4 MG/1
1 CAPSULE ORAL DAILY
Qty: 90 CAPSULE | Refills: 4 | Status: SHIPPED | OUTPATIENT
Start: 2022-01-21

## 2022-02-01 ENCOUNTER — APPOINTMENT (OUTPATIENT)
Dept: NUCLEAR MEDICINE | Facility: HOSPITAL | Age: 62
End: 2022-02-01

## 2022-02-21 ENCOUNTER — OFFICE VISIT (OUTPATIENT)
Dept: FAMILY MEDICINE CLINIC | Facility: CLINIC | Age: 62
End: 2022-02-21

## 2022-02-21 VITALS
BODY MASS INDEX: 25.09 KG/M2 | TEMPERATURE: 97.5 F | OXYGEN SATURATION: 98 % | HEIGHT: 72 IN | WEIGHT: 185.2 LBS | HEART RATE: 64 BPM | RESPIRATION RATE: 18 BRPM | DIASTOLIC BLOOD PRESSURE: 68 MMHG | SYSTOLIC BLOOD PRESSURE: 110 MMHG

## 2022-02-21 DIAGNOSIS — E78.5 DYSLIPIDEMIA: ICD-10-CM

## 2022-02-21 DIAGNOSIS — N40.1 BENIGN PROSTATIC HYPERPLASIA (BPH) WITH STRAINING ON URINATION: ICD-10-CM

## 2022-02-21 DIAGNOSIS — R39.16 BENIGN PROSTATIC HYPERPLASIA (BPH) WITH STRAINING ON URINATION: ICD-10-CM

## 2022-02-21 DIAGNOSIS — Z00.00 ENCOUNTER FOR MEDICAL EXAMINATION TO ESTABLISH CARE: Primary | ICD-10-CM

## 2022-02-21 DIAGNOSIS — R73.09 ABNORMAL GLUCOSE: ICD-10-CM

## 2022-02-21 DIAGNOSIS — E55.9 VITAMIN D DEFICIENCY: ICD-10-CM

## 2022-02-21 DIAGNOSIS — I25.10 CORONARY ARTERY DISEASE INVOLVING NATIVE CORONARY ARTERY OF NATIVE HEART WITHOUT ANGINA PECTORIS: ICD-10-CM

## 2022-02-21 PROCEDURE — 99214 OFFICE O/P EST MOD 30 MIN: CPT | Performed by: STUDENT IN AN ORGANIZED HEALTH CARE EDUCATION/TRAINING PROGRAM

## 2022-02-21 NOTE — PROGRESS NOTES
Chief Complaint  Pt presented to clinic with   Chief Complaint   Patient presents with   • Establish Care     NEW PT HERE TO ESTABLISH CARE          History of Present Illness  Mr. Melchor 61-year-old male who presented to the clinic for the first time for establishing medical care.  Patient reported he is seeing cardiologist for his heart issues and recently he was supposed to do stress test but he had coffee and his test was canceled because of that.  He also mentioned that he received some communication from the insurance saying that some test is not covered and he is going to send copy of that communication through email.  Patient also mentioned about having right lateral elbow pain.  He  works mostly in front of the computer and reported pain is on and off type.  Patient is originally from [place], was in [place] for some time because of his job and recently moved to [place].  Patient is  and kids are in [place].  Review of Systems   Constitutional: Negative for appetite change, fatigue and unexpected weight change.   HENT: Negative for congestion, rhinorrhea, sore throat, trouble swallowing and voice change.    Respiratory: Negative for chest tightness, shortness of breath and wheezing.    Cardiovascular: Negative for chest pain and palpitations.   Gastrointestinal: Negative for abdominal distention, abdominal pain, diarrhea, nausea and vomiting.   Genitourinary: Negative for decreased urine volume and flank pain.   Musculoskeletal: Negative for arthralgias and myalgias.   Skin: Negative for wound.   Neurological: Negative for tremors, weakness and headaches.   Hematological: Negative for adenopathy.   Psychiatric/Behavioral: Negative for dysphoric mood and sleep disturbance.       PMH:   Outpatient Medications Prior to Visit   Medication Sig Dispense Refill   • atorvastatin (Lipitor) 80 MG tablet Take 1 tablet by mouth Daily. 90 tablet 4   • carvedilol (COREG) 3.125 MG tablet Take 3.125 mg by  "mouth 2 (Two) Times a Day With Meals.     • enalapril (VASOTEC) 2.5 MG tablet Take 2.5 mg by mouth Daily.     • nitroglycerin (Nitrostat) 0.4 MG SL tablet Place 1 tablet under the tongue Every 5 (Five) Minutes As Needed for Chest Pain (up to 3 doses, and call 911). Take no more than 3 doses in 15 minutes. 20 tablet 2   • tamsulosin (FLOMAX) 0.4 MG capsule 24 hr capsule Take 1 capsule by mouth Daily. 90 capsule 4   • tazarotene (Tazorac) 0.1 % cream Apply 1 application topically to the appropriate area as directed Every Night. 30 g 3   • vitamin D (ERGOCALCIFEROL) 1.25 MG (64928 UT) capsule capsule Take 1 capsule by mouth 1 (One) Time Per Week. 12 capsule 4   • cyclobenzaprine (FLEXERIL) 5 MG tablet Take 1 tablet by mouth 2 (Two) Times a Day As Needed for Muscle Spasms. 20 tablet 1   • Diclofenac Sodium (VOLTAREN) 1 % gel gel Apply 4 g topically to the appropriate area as directed 4 (Four) Times a Day As Needed.     • dexamethasone (DECADRON) 4 MG tablet Take 1 tablet by mouth 2 (Two) Times a Day With Meals. 20 tablet 0     No facility-administered medications prior to visit.      No Known Allergies  History reviewed. No pertinent surgical history.  family history includes Glaucoma in his father; Heart attack in his father; Heart disease in his father.   reports that he has quit smoking. He has never used smokeless tobacco. He reports current alcohol use. Drug use questions deferred to the physician.     /68   Pulse 64   Temp 97.5 °F (36.4 °C) (Oral)   Resp 18   Ht 182.9 cm (72.01\")   Wt 84 kg (185 lb 3.2 oz)   SpO2 98%   BMI 25.11 kg/m²   Physical Exam  HENT:      Head: Normocephalic and atraumatic.      Mouth/Throat:      Mouth: Mucous membranes are moist.      Pharynx: Oropharynx is clear.   Eyes:      Extraocular Movements: Extraocular movements intact.      Conjunctiva/sclera: Conjunctivae normal.      Pupils: Pupils are equal, round, and reactive to light.   Cardiovascular:      Rate and Rhythm: " Normal rate and regular rhythm.   Pulmonary:      Effort: Pulmonary effort is normal.      Breath sounds: Normal breath sounds.   Abdominal:      General: Bowel sounds are normal.      Palpations: Abdomen is soft.   Musculoskeletal:         General: Normal range of motion.      Cervical back: Neck supple.   Skin:     General: Skin is warm.      Capillary Refill: Capillary refill takes less than 2 seconds.   Neurological:      General: No focal deficit present.      Mental Status: He is alert and oriented to person, place, and time. Mental status is at baseline.   Psychiatric:         Mood and Affect: Mood normal.          Diagnoses and all orders for this visit:    1. Encounter for medical examination to establish care (Primary)  Comments:  Future labs have been ordered today    2. Benign prostatic hyperplasia (BPH) with straining on urination  Comments:  on flomax 0.4 mg PO daily, continue same.    3. Vitamin D deficiency  Comments:  vit supplementation, continue same  Orders:  -     Vitamin D 25 Hydroxy; Future    4. Coronary artery disease involving native coronary artery of native heart without angina pectoris  Comments:  on coreg 3.125mg PO BID, enalapril 2.5 mg PO daily, currently asymptomatic , continue same, following cardiologist Dr. Beverly Avalos  Orders:  -     Basic Metabolic Panel; Future    5. Dyslipidemia  Comments:  on view of NYHA1 pt is placed on high dose statin by cardiologist, lipitor 80 mg PO daily.  Orders:  -     Lipid Panel With / Chol / HDL Ratio; Future    6. Abnormal glucose  -     Hemoglobin A1c; Future    Patient received some communication from insurance regarding some test not being covered, requested to bring that communication or email the communication to us.  Stress test was not done on the schedule day as patient had some coffee.    Needs yearly Flu vaccine  Dental visit and exam every year  Seat Belt always when driving or riding cars  Safe sex life to avoid STD  Healthy heart  diet  Exercise 3 times a week    Follow Up  3 months

## 2022-03-18 ENCOUNTER — HOSPITAL ENCOUNTER (OUTPATIENT)
Dept: NUCLEAR MEDICINE | Facility: HOSPITAL | Age: 62
Discharge: HOME | End: 2022-03-18

## 2022-03-18 PROCEDURE — 93017 CV STRESS TEST TRACING ONLY: CPT

## 2022-03-18 PROCEDURE — 78452 HT MUSCLE IMAGE SPECT MULT: CPT

## 2022-03-18 PROCEDURE — 93016 CV STRESS TEST SUPVJ ONLY: CPT | Performed by: INTERNAL MEDICINE

## 2022-03-18 PROCEDURE — 25010000002 REGADENOSON 0.4 MG/5ML SOLUTION: Performed by: INTERNAL MEDICINE

## 2022-03-18 PROCEDURE — 0 TECHNETIUM SESTAMIBI: Performed by: STUDENT IN AN ORGANIZED HEALTH CARE EDUCATION/TRAINING PROGRAM

## 2022-03-18 PROCEDURE — 0 TECHNETIUM SESTAMIBI: Performed by: INTERNAL MEDICINE

## 2022-03-18 PROCEDURE — A9500 TC99M SESTAMIBI: HCPCS | Performed by: STUDENT IN AN ORGANIZED HEALTH CARE EDUCATION/TRAINING PROGRAM

## 2022-03-18 PROCEDURE — 78452 HT MUSCLE IMAGE SPECT MULT: CPT | Performed by: INTERNAL MEDICINE

## 2022-03-18 PROCEDURE — A9500 TC99M SESTAMIBI: HCPCS | Performed by: INTERNAL MEDICINE

## 2022-03-18 PROCEDURE — 93018 CV STRESS TEST I&R ONLY: CPT | Performed by: INTERNAL MEDICINE

## 2022-03-18 RX ADMIN — REGADENOSON 0.4 MG: 0.08 INJECTION, SOLUTION INTRAVENOUS at 08:15

## 2022-03-18 RX ADMIN — TECHNETIUM TC 99M SESTAMIBI 1 DOSE: 1 INJECTION INTRAVENOUS at 07:04

## 2022-03-18 RX ADMIN — TECHNETIUM TC 99M SESTAMIBI 1 DOSE: 1 INJECTION INTRAVENOUS at 08:15

## 2022-04-26 ENCOUNTER — PATIENT MESSAGE (OUTPATIENT)
Dept: FAMILY MEDICINE CLINIC | Facility: CLINIC | Age: 62
End: 2022-04-26

## 2022-04-27 DIAGNOSIS — L98.9 SKIN LESION OF CHEEK: Primary | ICD-10-CM

## 2022-05-17 NOTE — TELEPHONE ENCOUNTER
This has been taken care of. Patient was give the phone number to the dermatologist and was instructed to call. I had confirmed that the referral was received. Nothing further needed at this time.      Graeme

## 2022-10-04 ENCOUNTER — OFFICE VISIT (OUTPATIENT)
Dept: FAMILY MEDICINE CLINIC | Facility: CLINIC | Age: 62
End: 2022-10-04

## 2022-10-04 DIAGNOSIS — N40.1 BENIGN PROSTATIC HYPERPLASIA (BPH) WITH STRAINING ON URINATION: ICD-10-CM

## 2022-10-04 DIAGNOSIS — I25.10 CORONARY ARTERY DISEASE INVOLVING NATIVE CORONARY ARTERY OF NATIVE HEART WITHOUT ANGINA PECTORIS: ICD-10-CM

## 2022-10-04 DIAGNOSIS — E78.5 DYSLIPIDEMIA: Primary | ICD-10-CM

## 2022-10-04 DIAGNOSIS — Z12.5 PROSTATE CANCER SCREENING: ICD-10-CM

## 2022-10-04 DIAGNOSIS — R73.01 IMPAIRED FASTING GLUCOSE: ICD-10-CM

## 2022-10-04 DIAGNOSIS — E55.9 VITAMIN D DEFICIENCY: ICD-10-CM

## 2022-10-04 DIAGNOSIS — R39.16 BENIGN PROSTATIC HYPERPLASIA (BPH) WITH STRAINING ON URINATION: ICD-10-CM

## 2022-10-04 PROCEDURE — 99214 OFFICE O/P EST MOD 30 MIN: CPT | Performed by: STUDENT IN AN ORGANIZED HEALTH CARE EDUCATION/TRAINING PROGRAM

## 2022-10-05 VITALS
HEART RATE: 90 BPM | HEIGHT: 72 IN | BODY MASS INDEX: 25.47 KG/M2 | OXYGEN SATURATION: 97 % | TEMPERATURE: 98 F | SYSTOLIC BLOOD PRESSURE: 111 MMHG | WEIGHT: 188 LBS | DIASTOLIC BLOOD PRESSURE: 66 MMHG

## 2022-10-06 NOTE — PROGRESS NOTES
"Chief Complaint  Med Refzoë Melchor presents to [facility]  History of Present Illness  For general follow-up.  Patient denies having any complaint today.  Review of system is negative for fever, headache, chest pain, shortness of breath, palpitation, nausea, vomiting, any recent change in bladder habits.        Objective   Vital Signs:  /66   Pulse 90   Temp 98 °F (36.7 °C)   Ht 182.9 cm (72.01\")   Wt 85.3 kg (188 lb)   SpO2 97%   BMI 25.49 kg/m²   Estimated body mass index is 25.49 kg/m² as calculated from the following:    Height as of this encounter: 182.9 cm (72.01\").    Weight as of this encounter: 85.3 kg (188 lb).          Physical Exam  HENT:      Head: Normocephalic and atraumatic.      Mouth/Throat:      Mouth: Mucous membranes are moist.      Pharynx: Oropharynx is clear.   Eyes:      Extraocular Movements: Extraocular movements intact.      Conjunctiva/sclera: Conjunctivae normal.      Pupils: Pupils are equal, round, and reactive to light.   Cardiovascular:      Rate and Rhythm: Normal rate and regular rhythm.   Pulmonary:      Effort: Pulmonary effort is normal.      Breath sounds: Normal breath sounds.   Abdominal:      General: Bowel sounds are normal.      Palpations: Abdomen is soft.   Musculoskeletal:         General: Normal range of motion.      Cervical back: Neck supple.   Skin:     General: Skin is warm.      Capillary Refill: Capillary refill takes less than 2 seconds.   Neurological:      General: No focal deficit present.      Mental Status: He is alert and oriented to person, place, and time. Mental status is at baseline.   Psychiatric:         Mood and Affect: Mood normal.        Result Review :                Assessment and Plan   Diagnoses and all orders for this visit:    1. Dyslipidemia (Primary)  -     CBC Auto Differential  -     Comprehensive Metabolic Panel  -     Lipid Panel With / Chol / HDL Ratio    2. " Vitamin D deficiency    3. Benign prostatic hyperplasia (BPH) with straining on urination    4. Impaired fasting glucose  -     Hemoglobin A1c    5. Coronary artery disease involving native coronary artery of native heart without angina pectoris  -     TSH    6. Prostate cancer screening  -     PSA SCREENING    Labs ordered, will follow up.  For coronary artery disease  Advised to continue Lipitor 80 mg p.o. daily along with Coreg 3.125 mg p.o. twice daily, enalapril 2.5 mg p.o. daily, patient has not followed back with cardiology after he did stress testing.  Advised patient to follow-up with cardiologist at earliest, increase and showed verbalized understanding    For BPH  Patient is on Flomax 1 capsule p.o. daily, continue same,    For vitamin D deficiency  Patient is on ergocalciferol 50,000 unit oral weekly    Patient glucose is on the higher side so A1c ordered    Patient stated he is going to [facility] in October and would like malarial prophylaxis.  Patient is given information about antibiotic prophylaxis and patient decided not to take any prophylaxis.         Follow Up   No follow-ups on file.  Patient was given instructions and counseling regarding his condition or for health maintenance advice. Please see specific information pulled into the AVS if appropriate.

## 2022-11-18 ENCOUNTER — OFFICE VISIT (OUTPATIENT)
Dept: FAMILY MEDICINE CLINIC | Facility: CLINIC | Age: 62
End: 2022-11-18

## 2022-11-18 VITALS
DIASTOLIC BLOOD PRESSURE: 70 MMHG | HEIGHT: 72 IN | SYSTOLIC BLOOD PRESSURE: 110 MMHG | OXYGEN SATURATION: 98 % | BODY MASS INDEX: 26.14 KG/M2 | HEART RATE: 68 BPM | TEMPERATURE: 97.8 F | WEIGHT: 193 LBS

## 2022-11-18 DIAGNOSIS — H93.8X1 SENSATION OF FULLNESS IN RIGHT EAR: Primary | ICD-10-CM

## 2022-11-18 PROCEDURE — 99213 OFFICE O/P EST LOW 20 MIN: CPT | Performed by: STUDENT IN AN ORGANIZED HEALTH CARE EDUCATION/TRAINING PROGRAM

## 2022-11-18 NOTE — PROGRESS NOTES
"Chief Complaint  Earache    Subjective        Tripp Melchor presents to [facility]  History of Present Illness  For feeling of right ear blockage.  Patient stated he does not have ear pain.  Patient stated he actually had video visit with some provider yesterday who advised him to use Debrox otic solution to clean the ears.  Patient stated that has not improved his symptoms.  Review of system is negative for fever, headache, chest pain, shortness of breath, palpitation, nausea, vomiting, any recent change in bladder habits.        Objective   Vital Signs:  /70   Pulse 68   Temp 97.8 °F (36.6 °C)   Ht 182.9 cm (72.01\")   Wt 87.5 kg (193 lb)   SpO2 98%   BMI 26.17 kg/m²   Estimated body mass index is 26.17 kg/m² as calculated from the following:    Height as of this encounter: 182.9 cm (72.01\").    Weight as of this encounter: 87.5 kg (193 lb).          Physical Exam  HENT:      Head: Normocephalic and atraumatic.      Ears:      Comments: Otoscopic examination both ear external auditory canal looks little inflamed as patient had used otic drops recently tympanic membrane bilateral shiny no fluid seen to be collected behind tympanic membrane     Mouth/Throat:      Mouth: Mucous membranes are moist.      Pharynx: Oropharynx is clear.   Eyes:      Extraocular Movements: Extraocular movements intact.      Conjunctiva/sclera: Conjunctivae normal.      Pupils: Pupils are equal, round, and reactive to light.   Cardiovascular:      Rate and Rhythm: Normal rate and regular rhythm.   Pulmonary:      Effort: Pulmonary effort is normal.      Breath sounds: Normal breath sounds.   Abdominal:      General: Bowel sounds are normal.      Palpations: Abdomen is soft.   Musculoskeletal:         General: Normal range of motion.      Cervical back: Neck supple.   Skin:     General: Skin is warm.      Capillary Refill: Capillary refill takes less than 2 seconds.   Neurological:      General: No " focal deficit present.      Mental Status: He is alert and oriented to person, place, and time. Mental status is at baseline.   Psychiatric:         Mood and Affect: Mood normal.        Result Review :                Assessment and Plan   Diagnoses and all orders for this visit:    1. Sensation of fullness in right ear (Primary)  Comments:  Otoscopic examination revealed no infection nothing needed to be done at this point, monitor symptoms, symptoms expected to improve within a week, if persistent ear symptoms present then ENT referral can be given for further management.             Follow Up   No follow-ups on file.  Patient was given instructions and counseling regarding his condition or for health maintenance advice. Please see specific information pulled into the AVS if appropriate.

## 2022-12-01 ENCOUNTER — OFFICE VISIT (OUTPATIENT)
Dept: FAMILY MEDICINE CLINIC | Facility: CLINIC | Age: 62
End: 2022-12-01

## 2022-12-01 VITALS
DIASTOLIC BLOOD PRESSURE: 80 MMHG | SYSTOLIC BLOOD PRESSURE: 110 MMHG | WEIGHT: 194.4 LBS | OXYGEN SATURATION: 96 % | TEMPERATURE: 98 F | BODY MASS INDEX: 26.33 KG/M2 | HEART RATE: 82 BPM | HEIGHT: 72 IN

## 2022-12-01 DIAGNOSIS — H93.8X1 EAR FULLNESS, RIGHT: ICD-10-CM

## 2022-12-01 DIAGNOSIS — H91.91 HEARING LOSS OF RIGHT EAR, UNSPECIFIED HEARING LOSS TYPE: ICD-10-CM

## 2022-12-01 DIAGNOSIS — R42 VERTIGO: Primary | ICD-10-CM

## 2022-12-01 PROCEDURE — 99213 OFFICE O/P EST LOW 20 MIN: CPT | Performed by: STUDENT IN AN ORGANIZED HEALTH CARE EDUCATION/TRAINING PROGRAM

## 2022-12-01 RX ORDER — MECLIZINE HCL 12.5 MG/1
12.5 TABLET ORAL 3 TIMES DAILY PRN
Qty: 60 TABLET | Refills: 0 | Status: SHIPPED | OUTPATIENT
Start: 2022-12-01

## 2022-12-07 ENCOUNTER — TELEPHONE (OUTPATIENT)
Dept: FAMILY MEDICINE CLINIC | Facility: CLINIC | Age: 62
End: 2022-12-07

## 2022-12-07 LAB
ALBUMIN SERPL-MCNC: 3.9 G/DL (ref 3.5–5.2)
ALBUMIN/GLOB SERPL: 1.8 G/DL
ALP SERPL-CCNC: 72 U/L (ref 39–117)
ALT SERPL-CCNC: 21 U/L (ref 1–41)
AST SERPL-CCNC: 20 U/L (ref 1–40)
BASOPHILS # BLD AUTO: 0.03 10*3/MM3 (ref 0–0.2)
BASOPHILS NFR BLD AUTO: 0.5 % (ref 0–1.5)
BILIRUB SERPL-MCNC: 0.5 MG/DL (ref 0–1.2)
BUN SERPL-MCNC: 15 MG/DL (ref 8–23)
BUN/CREAT SERPL: 20 (ref 7–25)
CALCIUM SERPL-MCNC: 9.4 MG/DL (ref 8.6–10.5)
CHLORIDE SERPL-SCNC: 105 MMOL/L (ref 98–107)
CHOLEST SERPL-MCNC: 130 MG/DL (ref 0–200)
CHOLEST/HDLC SERPL: 2.36 {RATIO}
CO2 SERPL-SCNC: 30.3 MMOL/L (ref 22–29)
CREAT SERPL-MCNC: 0.75 MG/DL (ref 0.76–1.27)
EGFRCR SERPLBLD CKD-EPI 2021: 102.7 ML/MIN/1.73
EOSINOPHIL # BLD AUTO: 0.15 10*3/MM3 (ref 0–0.4)
EOSINOPHIL NFR BLD AUTO: 2.3 % (ref 0.3–6.2)
ERYTHROCYTE [DISTWIDTH] IN BLOOD BY AUTOMATED COUNT: 12 % (ref 12.3–15.4)
GLOBULIN SER CALC-MCNC: 2.2 GM/DL
GLUCOSE SERPL-MCNC: 107 MG/DL (ref 65–99)
HBA1C MFR BLD: 6 % (ref 4.8–5.6)
HCT VFR BLD AUTO: 43.2 % (ref 37.5–51)
HDLC SERPL-MCNC: 55 MG/DL (ref 40–60)
HGB BLD-MCNC: 14.3 G/DL (ref 13–17.7)
IMM GRANULOCYTES # BLD AUTO: 0.01 10*3/MM3 (ref 0–0.05)
IMM GRANULOCYTES NFR BLD AUTO: 0.2 % (ref 0–0.5)
LDLC SERPL CALC-MCNC: 62 MG/DL (ref 0–100)
LYMPHOCYTES # BLD AUTO: 2.57 10*3/MM3 (ref 0.7–3.1)
LYMPHOCYTES NFR BLD AUTO: 39 % (ref 19.6–45.3)
MCH RBC QN AUTO: 30 PG (ref 26.6–33)
MCHC RBC AUTO-ENTMCNC: 33.1 G/DL (ref 31.5–35.7)
MCV RBC AUTO: 90.8 FL (ref 79–97)
MONOCYTES # BLD AUTO: 0.49 10*3/MM3 (ref 0.1–0.9)
MONOCYTES NFR BLD AUTO: 7.4 % (ref 5–12)
NEUTROPHILS # BLD AUTO: 3.34 10*3/MM3 (ref 1.7–7)
NEUTROPHILS NFR BLD AUTO: 50.6 % (ref 42.7–76)
NRBC BLD AUTO-RTO: 0 /100 WBC (ref 0–0.2)
PLATELET # BLD AUTO: 222 10*3/MM3 (ref 140–450)
POTASSIUM SERPL-SCNC: 4.7 MMOL/L (ref 3.5–5.2)
PROT SERPL-MCNC: 6.1 G/DL (ref 6–8.5)
PSA SERPL-MCNC: 0.53 NG/ML (ref 0–4)
RBC # BLD AUTO: 4.76 10*6/MM3 (ref 4.14–5.8)
SODIUM SERPL-SCNC: 141 MMOL/L (ref 136–145)
TRIGL SERPL-MCNC: 62 MG/DL (ref 0–150)
TSH SERPL DL<=0.005 MIU/L-ACNC: 0.81 UIU/ML (ref 0.27–4.2)
VLDLC SERPL CALC-MCNC: 13 MG/DL (ref 5–40)
WBC # BLD AUTO: 6.59 10*3/MM3 (ref 3.4–10.8)

## 2022-12-07 NOTE — TELEPHONE ENCOUNTER
Caller: [facility]    Relationship: Other    Best call back number: [phone]    What is the best time to reach you: ANY    Who are you requesting to speak with (clinical staff, provider,  specific staff member): DR. SHANKAR      What was the call regarding: [facility] IS CALLING TO ASK IF THE PATIENT'S LAST NOTES AND CARDIAC STABILITY DAIGNOSIS COULD BE FAXED TO THEIR OFFICE -[phone]. THE SURGEON IS DR. VINICIUS WEBBER. THEY ALSO ARE ASKING FOR PERMISSION TO HAVE A TOOTH EXTRACTION IN THE OFFICE WITH LOCAL ANESTHESIA.     Do you require a callback: YES

## 2022-12-08 ENCOUNTER — TELEPHONE (OUTPATIENT)
Dept: FAMILY MEDICINE CLINIC | Facility: CLINIC | Age: 62
End: 2022-12-08

## 2022-12-08 NOTE — TELEPHONE ENCOUNTER
Caller: Tripp Melchor    Relationship to patient: Self    Best call back number: [phone]    Patient is needing: TO FOLLOW UP ON EXISTING REFERRAL.

## 2022-12-09 NOTE — TELEPHONE ENCOUNTER
Caller: [facility]     Relationship: Other     Best call back number: [phone]     What is the best time to reach you: ANY     Who are you requesting to speak with (clinical staff, provider,  specific staff member): DR. SHANKAR        What was the call regarding: [facility] IS CALLING TO ASK IF THE PATIENT'S LAST NOTES AND CARDIAC STABILITY DAIGNOSIS COULD BE FAXED TO THEIR OFFICE [phone]. THE SURGEON IS DR. VINICIUS WEBBER. THEY ALSO ARE ASKING FOR PERMISSION TO HAVE A TOOTH EXTRACTION IN THE OFFICE WITH LOCAL ANESTHESIA.      Do you require a callback: YES                        Note          [facility] [phone]  Johana Hector RegSched Rep 2 days ago

## 2022-12-13 ENCOUNTER — TELEPHONE (OUTPATIENT)
Dept: FAMILY MEDICINE CLINIC | Facility: CLINIC | Age: 62
End: 2022-12-13

## 2022-12-13 NOTE — TELEPHONE ENCOUNTER
Caller: Tripp Melchor    Relationship: Self    Best call back number: [phone]    What is the best time to reach you: ANYTIME    Who are you requesting to speak with (clinical staff, provider,  specific staff member): CLINICAL    What was the call regarding: PATIENT STATES HE IS REQUESTING AN UPDATE ON HIS ENT REFERRAL AND WHICH FACILITY HE IS BEING REFERRED TO.     PLEASE CALL PATIENT BACK.

## 2023-05-15 ENCOUNTER — OFFICE VISIT (OUTPATIENT)
Dept: FAMILY MEDICINE CLINIC | Facility: CLINIC | Age: 63
End: 2023-05-15
Payer: COMMERCIAL

## 2023-05-15 VITALS
RESPIRATION RATE: 16 BRPM | HEART RATE: 79 BPM | SYSTOLIC BLOOD PRESSURE: 104 MMHG | BODY MASS INDEX: 26.55 KG/M2 | TEMPERATURE: 98.4 F | OXYGEN SATURATION: 96 % | DIASTOLIC BLOOD PRESSURE: 66 MMHG | HEIGHT: 72 IN | WEIGHT: 196 LBS

## 2023-05-15 DIAGNOSIS — R39.16 BENIGN PROSTATIC HYPERPLASIA (BPH) WITH STRAINING ON URINATION: ICD-10-CM

## 2023-05-15 DIAGNOSIS — R73.09 ABNORMAL GLUCOSE: ICD-10-CM

## 2023-05-15 DIAGNOSIS — H81.01 MENIERE DISEASE, RIGHT: Primary | ICD-10-CM

## 2023-05-15 DIAGNOSIS — E55.9 VITAMIN D DEFICIENCY: ICD-10-CM

## 2023-05-15 DIAGNOSIS — E78.5 DYSLIPIDEMIA: ICD-10-CM

## 2023-05-15 DIAGNOSIS — Z12.5 PROSTATE CANCER SCREENING: ICD-10-CM

## 2023-05-15 DIAGNOSIS — N40.1 BENIGN PROSTATIC HYPERPLASIA (BPH) WITH STRAINING ON URINATION: ICD-10-CM

## 2023-05-15 PROCEDURE — 99213 OFFICE O/P EST LOW 20 MIN: CPT | Performed by: STUDENT IN AN ORGANIZED HEALTH CARE EDUCATION/TRAINING PROGRAM

## 2023-05-15 RX ORDER — TRIAMTERENE AND HYDROCHLOROTHIAZIDE 37.5; 25 MG/1; MG/1
1 TABLET ORAL DAILY
COMMUNITY

## 2023-05-15 NOTE — PROGRESS NOTES
"Chief Complaint  Dizziness    Subjective        Tripp Melchor presents to [facility]  History of Present Illness  For vertigo/ meniere disease  Pt stated his symptoms has improved but he always has fear that he will have another worse episode of vertigo. Stated seeing Dr gasca @[facility] ENT specailist.  Also complains of increase hunger and appetite, stated gained weight recently  Review of system is negative for fever, headache, chest pain, shortness of breath, palpitation, nausea, vomiting, any recent change in bladder habits.          Objective   Vital Signs:  /66   Pulse 79   Temp 98.4 °F (36.9 °C)   Resp 16   Ht 182.9 cm (72.01\")   Wt 88.9 kg (196 lb)   SpO2 96%   BMI 26.58 kg/m²   Estimated body mass index is 26.58 kg/m² as calculated from the following:    Height as of this encounter: 182.9 cm (72.01\").    Weight as of this encounter: 88.9 kg (196 lb).             Physical Exam  Eyes:      Extraocular Movements: Extraocular movements intact.      Conjunctiva/sclera: Conjunctivae normal.      Pupils: Pupils are equal, round, and reactive to light.   Cardiovascular:      Pulses: Normal pulses.   Pulmonary:      Effort: Pulmonary effort is normal.   Abdominal:      General: Abdomen is flat.      Palpations: Abdomen is soft.   Musculoskeletal:         General: Normal range of motion.      Cervical back: Normal range of motion.   Skin:     General: Skin is warm.   Neurological:      Mental Status: He is alert and oriented to person, place, and time. Mental status is at baseline.   Psychiatric:         Mood and Affect: Mood normal.        Result Review :                   Assessment and Plan   Diagnoses and all orders for this visit:    1. Meniere disease, right (Primary)  Comments:  following ENT specialist, continue triamterene HCTz medication and salt restriction.    2. Abnormal glucose  Comments:  will repeat A1c along with fasting glucose, advise pt to eat more fruits " and vegetables instead of high carb diet, will follow up  Orders:  -     CBC Auto Differential; Future  -     Comprehensive Metabolic Panel; Future  -     Lipid Panel With / Chol / HDL Ratio; Future  -     TSH; Future  -     Hemoglobin A1c; Future  -     Vitamin D,25-Hydroxy; Future  -     Hemoglobin A1c  -     Comprehensive metabolic panel    3. Dyslipidemia  -     CBC Auto Differential; Future  -     Comprehensive Metabolic Panel; Future  -     Lipid Panel With / Chol / HDL Ratio; Future  -     TSH; Future  -     Hemoglobin A1c; Future  -     Vitamin D,25-Hydroxy; Future  -     Comprehensive metabolic panel    4. Vitamin D deficiency  -     CBC Auto Differential; Future  -     Comprehensive Metabolic Panel; Future  -     Lipid Panel With / Chol / HDL Ratio; Future  -     TSH; Future  -     Hemoglobin A1c; Future  -     Vitamin D,25-Hydroxy; Future    5. Benign prostatic hyperplasia (BPH) with straining on urination  -     CBC Auto Differential; Future  -     Comprehensive Metabolic Panel; Future  -     Lipid Panel With / Chol / HDL Ratio; Future  -     TSH; Future  -     Hemoglobin A1c; Future  -     Vitamin D,25-Hydroxy; Future    6. Prostate cancer screening  -     CBC Auto Differential; Future  -     Comprehensive Metabolic Panel; Future  -     Lipid Panel With / Chol / HDL Ratio; Future  -     TSH; Future  -     Hemoglobin A1c; Future  -     Vitamin D,25-Hydroxy; Future  -     PSA SCREENING; Future    RTC in 6 months for physical           Follow Up   No follow-ups on file.  Patient was given instructions and counseling regarding his condition or for health maintenance advice. Please see specific information pulled into the AVS if appropriate.

## 2023-05-16 LAB
ALBUMIN SERPL-MCNC: 4.4 G/DL (ref 3.5–5.2)
ALBUMIN/GLOB SERPL: 2.4 G/DL
ALP SERPL-CCNC: 67 U/L (ref 39–117)
ALT SERPL-CCNC: 27 U/L (ref 1–41)
AST SERPL-CCNC: 24 U/L (ref 1–40)
BILIRUB SERPL-MCNC: 0.3 MG/DL (ref 0–1.2)
BUN SERPL-MCNC: 12 MG/DL (ref 8–23)
BUN/CREAT SERPL: 15.4 (ref 7–25)
CALCIUM SERPL-MCNC: 9.9 MG/DL (ref 8.6–10.5)
CHLORIDE SERPL-SCNC: 105 MMOL/L (ref 98–107)
CO2 SERPL-SCNC: 27.7 MMOL/L (ref 22–29)
CREAT SERPL-MCNC: 0.78 MG/DL (ref 0.76–1.27)
EGFRCR SERPLBLD CKD-EPI 2021: 100.8 ML/MIN/1.73
GLOBULIN SER CALC-MCNC: 1.8 GM/DL
GLUCOSE SERPL-MCNC: 105 MG/DL (ref 65–99)
HBA1C MFR BLD: 5.7 % (ref 4.8–5.6)
POTASSIUM SERPL-SCNC: 4.9 MMOL/L (ref 3.5–5.2)
PROT SERPL-MCNC: 6.2 G/DL (ref 6–8.5)
SODIUM SERPL-SCNC: 142 MMOL/L (ref 136–145)

## 2023-12-04 ENCOUNTER — OFFICE VISIT (OUTPATIENT)
Dept: FAMILY MEDICINE CLINIC | Facility: CLINIC | Age: 63
End: 2023-12-04
Payer: COMMERCIAL

## 2023-12-04 VITALS
HEIGHT: 72 IN | WEIGHT: 193.3 LBS | TEMPERATURE: 98.1 F | HEART RATE: 73 BPM | SYSTOLIC BLOOD PRESSURE: 112 MMHG | DIASTOLIC BLOOD PRESSURE: 62 MMHG | RESPIRATION RATE: 14 BRPM | BODY MASS INDEX: 26.18 KG/M2 | OXYGEN SATURATION: 97 %

## 2023-12-04 DIAGNOSIS — E55.9 VITAMIN D DEFICIENCY: ICD-10-CM

## 2023-12-04 DIAGNOSIS — H81.01 MENIERE DISEASE, RIGHT: ICD-10-CM

## 2023-12-04 DIAGNOSIS — R73.09 ABNORMAL GLUCOSE: ICD-10-CM

## 2023-12-04 DIAGNOSIS — R39.16 BENIGN PROSTATIC HYPERPLASIA (BPH) WITH STRAINING ON URINATION: ICD-10-CM

## 2023-12-04 DIAGNOSIS — I42.9 CARDIOMYOPATHY, UNSPECIFIED TYPE: Primary | ICD-10-CM

## 2023-12-04 DIAGNOSIS — N40.1 BENIGN PROSTATIC HYPERPLASIA (BPH) WITH STRAINING ON URINATION: ICD-10-CM

## 2023-12-04 DIAGNOSIS — E78.5 DYSLIPIDEMIA: ICD-10-CM

## 2023-12-04 PROCEDURE — 99214 OFFICE O/P EST MOD 30 MIN: CPT | Performed by: STUDENT IN AN ORGANIZED HEALTH CARE EDUCATION/TRAINING PROGRAM

## 2023-12-04 RX ORDER — HYDROCHLOROTHIAZIDE 25 MG/1
25 TABLET ORAL EVERY MORNING
COMMUNITY
Start: 2023-11-22

## 2023-12-04 RX ORDER — LISINOPRIL 2.5 MG/1
2.5 TABLET ORAL DAILY
COMMUNITY

## 2023-12-05 NOTE — PROGRESS NOTES
"Chief Complaint  Annual Exam (Discuss labs, and discuss meneres disease)    Subjective        Tripp Melchor presents to [facility]  History of Present Illness  For follow-up on the recent labs done.  Stated he is still having symptoms with Ménière's disease and most recently he has seen Dr. Lawton who has recommended low-sodium diet which is equivalent to no salt.  Patient stated he is comfortable eating food without any salt.  Patient stated he has not taken his cholesterol medication for almost 2 to 3 months because he was trying to find the cause of his Ménière's disease.  Does not have any new complaint today  Objective   Vital Signs:  /62 (BP Location: Left arm, Patient Position: Standing, Cuff Size: Adult)   Pulse 73   Temp 98.1 °F (36.7 °C) (Core)   Resp 14   Ht 182.9 cm (72.01\")   Wt 87.7 kg (193 lb 4.8 oz)   SpO2 97%   BMI 26.21 kg/m²   Estimated body mass index is 26.21 kg/m² as calculated from the following:    Height as of this encounter: 182.9 cm (72.01\").    Weight as of this encounter: 87.7 kg (193 lb 4.8 oz).       BMI is >= 25 and <30. (Overweight) The following options were offered after discussion;: exercise counseling/recommendations and nutrition counseling/recommendations      Physical Exam  HENT:      Head: Normocephalic and atraumatic.      Mouth/Throat:      Mouth: Mucous membranes are moist.      Pharynx: Oropharynx is clear.   Eyes:      Extraocular Movements: Extraocular movements intact.      Conjunctiva/sclera: Conjunctivae normal.      Pupils: Pupils are equal, round, and reactive to light.   Cardiovascular:      Rate and Rhythm: Normal rate and regular rhythm.   Pulmonary:      Effort: Pulmonary effort is normal.      Breath sounds: Normal breath sounds.   Abdominal:      General: Bowel sounds are normal.      Palpations: Abdomen is soft.   Musculoskeletal:         General: Normal range of motion.      Cervical back: Neck supple.   Skin:     " General: Skin is warm.      Capillary Refill: Capillary refill takes less than 2 seconds.   Neurological:      General: No focal deficit present.      Mental Status: He is alert and oriented to person, place, and time. Mental status is at baseline.   Psychiatric:         Mood and Affect: Mood normal.        Result Review :  The following data was reviewed by: Tyra Chacko MD on 12/04/2023:  CMP          5/15/2023    09:15 11/29/2023    08:02   CMP   Glucose 105  112    BUN 12  16    Creatinine 0.78  0.89    Sodium 142  141    Potassium 4.9  4.7    Chloride 105  103    Calcium 9.9  9.7    Total Protein 6.2  6.3    Albumin 4.4  4.3    Globulin 1.8  2.0    Total Bilirubin 0.3  0.2    Alkaline Phosphatase 67  76    AST (SGOT) 24  22    ALT (SGPT) 27  22    BUN/Creatinine Ratio 15.4  18.0      CBC          11/29/2023    08:02   CBC   WBC 6.47    RBC 4.92    Hemoglobin 15.1    Hematocrit 45.6    MCV 92.7    MCH 30.7    MCHC 33.1    RDW 12.0    Platelets 285      Lipid Panel          11/29/2023    08:02   Lipid Panel   Total Cholesterol 230    Triglycerides 63    HDL Cholesterol 55    VLDL Cholesterol 11    LDL Cholesterol  164      TSH          11/29/2023    08:02   TSH   TSH 0.640                   Assessment and Plan   Diagnoses and all orders for this visit:    1. Cardiomyopathy, unspecified type (Primary)  -     Adult Transthoracic Echo Complete W/ Cont if Necessary Per Protocol    2. Dyslipidemia    3. Vitamin D deficiency    4. Benign prostatic hyperplasia (BPH) with straining on urination    5. Abnormal glucose    6. Meniere disease, right    # Hyperlipidemia  Lipid profile worsened, patient admitted he was not taking medication in the past 3 months  Agree with resuming statins  # Vitamin D deficiency  Over-the-counter vitamin D supplementation  # Ménière's disease  Following ENT Dr. Lawton  # Cardiomyopathy  Last echocardiogram was done in 2021, will repeat echocardiogram  On Coreg, ACE inhibitor, statins  For now  continue same    RTC in 6 months with with labs       Follow Up   No follow-ups on file.  Patient was given instructions and counseling regarding his condition or for health maintenance advice. Please see specific information pulled into the AVS if appropriate.

## 2023-12-18 ENCOUNTER — HOSPITAL ENCOUNTER (OUTPATIENT)
Dept: CARDIOLOGY | Facility: HOSPITAL | Age: 63
Discharge: HOME | End: 2023-12-18
Admitting: STUDENT IN AN ORGANIZED HEALTH CARE EDUCATION/TRAINING PROGRAM
Payer: COMMERCIAL

## 2023-12-18 VITALS
WEIGHT: 193 LBS | BODY MASS INDEX: 26.14 KG/M2 | SYSTOLIC BLOOD PRESSURE: 106 MMHG | DIASTOLIC BLOOD PRESSURE: 69 MMHG | HEIGHT: 72 IN

## 2023-12-18 LAB
ASCENDING AORTA: 3.2 CM
BH CV ECHO MEAS - ACS: 1.98 CM
BH CV ECHO MEAS - AO MAX PG: 3.8 MMHG
BH CV ECHO MEAS - AO MEAN PG: 2.04 MMHG
BH CV ECHO MEAS - AO ROOT DIAM: 3.3 CM
BH CV ECHO MEAS - AO V2 MAX: 97 CM/SEC
BH CV ECHO MEAS - AO V2 VTI: 20.1 CM
BH CV ECHO MEAS - AVA(I,D): 2.8 CM2
BH CV ECHO MEAS - EDV(CUBED): 156.4 ML
BH CV ECHO MEAS - EDV(MOD-SP2): 118 ML
BH CV ECHO MEAS - EDV(MOD-SP4): 152 ML
BH CV ECHO MEAS - EF(MOD-BP): 46.6 %
BH CV ECHO MEAS - EF(MOD-SP2): 49.2 %
BH CV ECHO MEAS - EF(MOD-SP4): 44.7 %
BH CV ECHO MEAS - ESV(CUBED): 103.3 ML
BH CV ECHO MEAS - ESV(MOD-SP2): 60 ML
BH CV ECHO MEAS - ESV(MOD-SP4): 84 ML
BH CV ECHO MEAS - FS: 12.9 %
BH CV ECHO MEAS - IVS/LVPW: 1.06 CM
BH CV ECHO MEAS - IVSD: 1.02 CM
BH CV ECHO MEAS - LAT PEAK E' VEL: 10.4 CM/SEC
BH CV ECHO MEAS - LV MASS(C)D: 204.1 GRAMS
BH CV ECHO MEAS - LV MAX PG: 2.06 MMHG
BH CV ECHO MEAS - LV MEAN PG: 1.04 MMHG
BH CV ECHO MEAS - LV V1 MAX: 71.8 CM/SEC
BH CV ECHO MEAS - LV V1 VTI: 15.6 CM
BH CV ECHO MEAS - LVIDD: 5.4 CM
BH CV ECHO MEAS - LVIDS: 4.7 CM
BH CV ECHO MEAS - LVOT AREA: 3.6 CM2
BH CV ECHO MEAS - LVOT DIAM: 2.13 CM
BH CV ECHO MEAS - LVPWD: 0.96 CM
BH CV ECHO MEAS - MED PEAK E' VEL: 5.9 CM/SEC
BH CV ECHO MEAS - MV A DUR: 0.15 SEC
BH CV ECHO MEAS - MV A MAX VEL: 41.8 CM/SEC
BH CV ECHO MEAS - MV DEC SLOPE: 259 CM/SEC2
BH CV ECHO MEAS - MV DEC TIME: 0.32 SEC
BH CV ECHO MEAS - MV E MAX VEL: 39.1 CM/SEC
BH CV ECHO MEAS - MV E/A: 0.94
BH CV ECHO MEAS - MV MAX PG: 1.78 MMHG
BH CV ECHO MEAS - MV MEAN PG: 0.79 MMHG
BH CV ECHO MEAS - MV P1/2T: 71.1 MSEC
BH CV ECHO MEAS - MV V2 VTI: 18.9 CM
BH CV ECHO MEAS - MVA(P1/2T): 3.1 CM2
BH CV ECHO MEAS - MVA(VTI): 3 CM2
BH CV ECHO MEAS - PA ACC TIME: 0.14 SEC
BH CV ECHO MEAS - PA V2 MAX: 64 CM/SEC
BH CV ECHO MEAS - RAP SYSTOLE: 3 MMHG
BH CV ECHO MEAS - RV MAX PG: 1.22 MMHG
BH CV ECHO MEAS - RV V1 MAX: 55.3 CM/SEC
BH CV ECHO MEAS - RV V1 VTI: 11.4 CM
BH CV ECHO MEAS - RVSP: 16 MMHG
BH CV ECHO MEAS - SUP REN AO DIAM: 2 CM
BH CV ECHO MEAS - SV(LVOT): 55.7 ML
BH CV ECHO MEAS - SV(MOD-SP2): 58 ML
BH CV ECHO MEAS - SV(MOD-SP4): 68 ML
BH CV ECHO MEAS - TAPSE (>1.6): 1.38 CM
BH CV ECHO MEAS - TR MAX PG: 13.4 MMHG
BH CV ECHO MEAS - TR MAX VEL: 183.3 CM/SEC
BH CV ECHO MEASUREMENTS AVERAGE E/E' RATIO: 4.8
BH CV XLRA - RV BASE: 3.2 CM
BH CV XLRA - RV LENGTH: 6 CM
BH CV XLRA - RV MID: 3 CM
BH CV XLRA - TDI S': 10 CM/SEC
LEFT ATRIUM VOLUME INDEX: 20.3 ML/M2
SINUS: 3.2 CM
STJ: 3 CM

## 2023-12-18 PROCEDURE — 25510000001 PERFLUTREN (DEFINITY) 8.476 MG IN SODIUM CHLORIDE (PF) 0.9 % 10 ML INJECTION: Performed by: STUDENT IN AN ORGANIZED HEALTH CARE EDUCATION/TRAINING PROGRAM

## 2023-12-18 PROCEDURE — 93306 TTE W/DOPPLER COMPLETE: CPT

## 2023-12-18 RX ADMIN — SODIUM CHLORIDE 2 ML: 9 INJECTION INTRAMUSCULAR; INTRAVENOUS; SUBCUTANEOUS at 07:50

## 2023-12-29 ENCOUNTER — TRANSCRIBE ORDERS (OUTPATIENT)
Dept: ADMINISTRATIVE | Facility: HOSPITAL | Age: 63
End: 2023-12-29
Payer: COMMERCIAL

## 2023-12-29 DIAGNOSIS — H91.20 SUDDEN IDIOPATHIC HEARING LOSS, UNSPECIFIED LATERALITY: Primary | ICD-10-CM

## 2024-01-27 ENCOUNTER — HOSPITAL ENCOUNTER (OUTPATIENT)
Facility: HOSPITAL | Age: 64
Discharge: HOME | End: 2024-01-27
Admitting: OTOLARYNGOLOGY
Payer: COMMERCIAL

## 2024-01-27 DIAGNOSIS — H91.20 SUDDEN IDIOPATHIC HEARING LOSS, UNSPECIFIED LATERALITY: ICD-10-CM

## 2024-01-27 PROCEDURE — 0 GADOBENATE DIMEGLUMINE 529 MG/ML SOLUTION: Performed by: OTOLARYNGOLOGY

## 2024-01-27 PROCEDURE — A9577 INJ MULTIHANCE: HCPCS | Performed by: OTOLARYNGOLOGY

## 2024-01-27 PROCEDURE — 70553 MRI BRAIN STEM W/O & W/DYE: CPT

## 2024-01-27 RX ADMIN — GADOBENATE DIMEGLUMINE 18 ML: 529 INJECTION, SOLUTION INTRAVENOUS at 13:20

## 2024-06-17 ENCOUNTER — OFFICE VISIT (OUTPATIENT)
Dept: FAMILY MEDICINE CLINIC | Facility: CLINIC | Age: 64
End: 2024-06-17
Payer: COMMERCIAL

## 2024-06-17 VITALS
RESPIRATION RATE: 16 BRPM | TEMPERATURE: 98.2 F | BODY MASS INDEX: 26.67 KG/M2 | SYSTOLIC BLOOD PRESSURE: 118 MMHG | DIASTOLIC BLOOD PRESSURE: 76 MMHG | HEIGHT: 72 IN | OXYGEN SATURATION: 95 % | WEIGHT: 196.9 LBS | HEART RATE: 91 BPM

## 2024-06-17 DIAGNOSIS — E55.9 VITAMIN D DEFICIENCY: ICD-10-CM

## 2024-06-17 DIAGNOSIS — R73.09 ABNORMAL GLUCOSE: ICD-10-CM

## 2024-06-17 DIAGNOSIS — E78.5 DYSLIPIDEMIA: ICD-10-CM

## 2024-06-17 DIAGNOSIS — I42.9 CARDIOMYOPATHY, UNSPECIFIED TYPE: ICD-10-CM

## 2024-06-17 DIAGNOSIS — E87.5 HYPERKALEMIA: Primary | ICD-10-CM

## 2024-06-17 PROCEDURE — 99214 OFFICE O/P EST MOD 30 MIN: CPT | Performed by: STUDENT IN AN ORGANIZED HEALTH CARE EDUCATION/TRAINING PROGRAM

## 2024-06-17 NOTE — PROGRESS NOTES
"Chief Complaint  Cardiomyopathy    Subjective        Tripp Melchor presents to [facility]  History of Present Illness  For follow-up on abnormal labs, CAD, cardiomyopathy, SNHL right side  Patient denies having any issues today  Patient bought hearing aid from [employer] and that is helping him.  Patient stated his he is hearing loss because is not very clear and they do not think it is Ménière's disease.  Patient currently not taking any medication for that.  In process of getting permanent hearing aid  Objective   Vital Signs:  /76 (BP Location: Left arm, Patient Position: Sitting, Cuff Size: Adult)   Pulse 91   Temp 98.2 °F (36.8 °C) (Oral)   Resp 16   Ht 182.9 cm (72\")   Wt 89.3 kg (196 lb 14.4 oz)   SpO2 95%   BMI 26.70 kg/m²   Estimated body mass index is 26.7 kg/m² as calculated from the following:    Height as of this encounter: 182.9 cm (72\").    Weight as of this encounter: 89.3 kg (196 lb 14.4 oz).               Physical Exam  Cardiovascular:      Rate and Rhythm: Normal rate.      Pulses: Normal pulses.   Pulmonary:      Effort: Pulmonary effort is normal.      Breath sounds: Normal breath sounds.   Abdominal:      General: Bowel sounds are normal.      Palpations: Abdomen is soft.   Musculoskeletal:      Right lower leg: No edema.      Left lower leg: No edema.   Neurological:      Mental Status: He is alert.        Result Review :    The following data was reviewed by: Tyra Chacko MD on 06/17/2024:  CMP          11/29/2023    08:02 6/13/2024    08:15   CMP   Glucose 112  102    BUN 16  13    Creatinine 0.89  0.90    Sodium 141  137    Potassium 4.7  5.6    Chloride 103  103    Calcium 9.7  9.7    Total Protein 6.3  6.7    Albumin 4.3  4.6    Globulin 2.0  2.1    Total Bilirubin 0.2  0.4    Alkaline Phosphatase 76  90    AST (SGOT) 22  26    ALT (SGPT) 22  27    BUN/Creatinine Ratio 18.0  14.4      CBC          11/29/2023    08:02 6/13/2024    08:15   CBC "   WBC 6.47  6.08    RBC 4.92  4.82    Hemoglobin 15.1  15.1    Hematocrit 45.6  45.1    MCV 92.7  93.6    MCH 30.7  31.3    MCHC 33.1  33.5    RDW 12.0  12.0    Platelets 285  246      Lipid Panel          11/29/2023    08:02 6/13/2024    08:15   Lipid Panel   Total Cholesterol 230  132    Triglycerides 63  62    HDL Cholesterol 55  59    VLDL Cholesterol 11  13    LDL Cholesterol  164  60    LDL/HDL Ratio  1.03                   Assessment and Plan     Diagnoses and all orders for this visit:    1. Hyperkalemia (Primary)  -     Cancel: ECG 12 Lead  -     Potassium    2. Dyslipidemia    3. Vitamin D deficiency    4. Cardiomyopathy, unspecified type    5. Abnormal glucose    # CAD  Continue ACE inhibitor, beta-blocker  Blood pressure and heart rate with all of these medications are within normal limit  # Hyperkalemia  Advised patient to repeat potassium in a week  EKG was offered, patient has to go for the meeting.  Stated if potassium will be high in the next lab he will make an appointment for an EKG  # SNHL right ear  Wearing hearing aid  # Hyperlipidemia  LDL at goal  Continue atorvastatin 80 mg p.o. daily  Patient is tolerating medication well  RTC in 6-month for physical with lab       Follow Up     No follow-ups on file.  Patient was given instructions and counseling regarding his condition or for health maintenance advice. Please see specific information pulled into the AVS if appropriate.

## 2024-12-03 ENCOUNTER — OFFICE VISIT (OUTPATIENT)
Dept: FAMILY MEDICINE CLINIC | Facility: CLINIC | Age: 64
End: 2024-12-03
Payer: COMMERCIAL

## 2024-12-03 VITALS
RESPIRATION RATE: 16 BRPM | HEART RATE: 79 BPM | SYSTOLIC BLOOD PRESSURE: 102 MMHG | WEIGHT: 202.7 LBS | TEMPERATURE: 98.2 F | HEIGHT: 72 IN | DIASTOLIC BLOOD PRESSURE: 88 MMHG | OXYGEN SATURATION: 95 % | BODY MASS INDEX: 27.45 KG/M2

## 2024-12-03 DIAGNOSIS — Z12.5 PROSTATE CANCER SCREENING: ICD-10-CM

## 2024-12-03 DIAGNOSIS — Z00.00 ANNUAL PHYSICAL EXAM: ICD-10-CM

## 2024-12-03 DIAGNOSIS — R39.198 DIFFICULTY URINATING: Primary | ICD-10-CM

## 2024-12-03 DIAGNOSIS — R73.09 ABNORMAL GLUCOSE: ICD-10-CM

## 2024-12-03 DIAGNOSIS — R35.0 URINE FREQUENCY: ICD-10-CM

## 2024-12-03 DIAGNOSIS — N40.1 BENIGN PROSTATIC HYPERPLASIA (BPH) WITH STRAINING ON URINATION: ICD-10-CM

## 2024-12-03 DIAGNOSIS — E55.9 VITAMIN D DEFICIENCY: ICD-10-CM

## 2024-12-03 DIAGNOSIS — R39.16 BENIGN PROSTATIC HYPERPLASIA (BPH) WITH STRAINING ON URINATION: ICD-10-CM

## 2024-12-03 LAB
BILIRUB BLD-MCNC: NEGATIVE MG/DL
CLARITY, POC: CLEAR
COLOR UR: YELLOW
EXPIRATION DATE: NORMAL
GLUCOSE UR STRIP-MCNC: NEGATIVE MG/DL
KETONES UR QL: NEGATIVE
LEUKOCYTE EST, POC: NEGATIVE
Lab: NORMAL
NITRITE UR-MCNC: NEGATIVE MG/ML
PH UR: 6 [PH] (ref 5–8)
PROT UR STRIP-MCNC: NEGATIVE MG/DL
RBC # UR STRIP: NEGATIVE /UL
SP GR UR: 1.03 (ref 1–1.03)
UROBILINOGEN UR QL: NORMAL

## 2024-12-03 PROCEDURE — 81003 URINALYSIS AUTO W/O SCOPE: CPT | Performed by: STUDENT IN AN ORGANIZED HEALTH CARE EDUCATION/TRAINING PROGRAM

## 2024-12-03 PROCEDURE — 99214 OFFICE O/P EST MOD 30 MIN: CPT | Performed by: STUDENT IN AN ORGANIZED HEALTH CARE EDUCATION/TRAINING PROGRAM

## 2024-12-03 RX ORDER — TAMSULOSIN HYDROCHLORIDE 0.4 MG/1
1 CAPSULE ORAL DAILY
Qty: 90 CAPSULE | Refills: 1 | Status: SHIPPED | OUTPATIENT
Start: 2024-12-03

## 2024-12-03 NOTE — PROGRESS NOTES
"Chief Complaint  Difficulty Urinating (POC U/A.), Urinary Frequency, and Urinary Retention (Slow flow when urinating.)    Subjective    History of Present Illness  The patient is a 64-year-old gentleman who is here for an acute problem.    He reports experiencing urinary issues, including increased frequency and a slow flow. He also describes a sensation of his bladder expanding and lizbeth, accompanied by a queasy feeling and occasional pain. These symptoms began around 2019. He has been taking hydrochlorothiazide, a diuretic medication, since then. His cousin, who had similar symptoms, suggested that he might have a prostate issue and recommended a medical evaluation. He does not recall having seen a urologist or taking Flomax, a medication commonly used to treat benign prostatic hyperplasia (BPH).       Tripp Melchor presents to [facility]  Difficulty Urinating   Associated symptoms include frequency.   Urinary Frequency   Associated symptoms include frequency.       Objective   Vital Signs:  /88 (BP Location: Left arm, Patient Position: Sitting, Cuff Size: Large Adult)   Pulse 79   Temp 98.2 °F (36.8 °C) (Oral)   Resp 16   Ht 182.9 cm (72\")   Wt 91.9 kg (202 lb 11.2 oz)   SpO2 95%   BMI 27.49 kg/m²   Estimated body mass index is 27.49 kg/m² as calculated from the following:    Height as of this encounter: 182.9 cm (72\").    Weight as of this encounter: 91.9 kg (202 lb 11.2 oz).    BMI is >= 25 and <30. (Overweight) The following options were offered after discussion;: exercise counseling/recommendations and nutrition counseling/recommendations      Physical Exam  HENT:      Head: Normocephalic and atraumatic.      Mouth/Throat:      Mouth: Mucous membranes are moist.      Pharynx: Oropharynx is clear.   Eyes:      Extraocular Movements: Extraocular movements intact.      Conjunctiva/sclera: Conjunctivae normal.      Pupils: Pupils are equal, round, and reactive " to light.   Cardiovascular:      Rate and Rhythm: Normal rate and regular rhythm.   Pulmonary:      Effort: Pulmonary effort is normal.      Breath sounds: Normal breath sounds.   Abdominal:      General: Bowel sounds are normal.      Palpations: Abdomen is soft.   Musculoskeletal:         General: Normal range of motion.      Cervical back: Neck supple.   Skin:     General: Skin is warm.      Capillary Refill: Capillary refill takes less than 2 seconds.   Neurological:      General: No focal deficit present.      Mental Status: He is alert and oriented to person, place, and time. Mental status is at baseline.   Psychiatric:         Mood and Affect: Mood normal.        Result Review :  The following data was reviewed by: Tyra Chacko MD on 12/03/2024:  CMP          6/13/2024    08:15   CMP   Glucose 102    BUN 13    Creatinine 0.90    Sodium 137    Potassium 5.6    Chloride 103    Calcium 9.7    Total Protein 6.7    Albumin 4.6    Globulin 2.1    Total Bilirubin 0.4    Alkaline Phosphatase 90    AST (SGOT) 26    ALT (SGPT) 27    BUN/Creatinine Ratio 14.4      CBC          6/13/2024    08:15   CBC   WBC 6.08    RBC 4.82    Hemoglobin 15.1    Hematocrit 45.1    MCV 93.6    MCH 31.3    MCHC 33.5    RDW 12.0    Platelets 246      Lipid Panel          6/13/2024    08:15   Lipid Panel   Total Cholesterol 132    Triglycerides 62    HDL Cholesterol 59    VLDL Cholesterol 13    LDL Cholesterol  60    LDL/HDL Ratio 1.03                  Assessment and Plan   Diagnoses and all orders for this visit:    1. Difficulty urinating (Primary)  -     Cancel: Urinalysis With Microscopic If Indicated (No Culture) - Urine, Clean Catch  -     Microalbumin / Creatinine Urine Ratio - Urine, Clean Catch  -     POC Urinalysis Dipstick, Automated    2. Urine frequency  -     Cancel: Urinalysis With Microscopic If Indicated (No Culture) - Urine, Clean Catch  -     Microalbumin / Creatinine Urine Ratio - Urine, Clean Catch  -     POC  Urinalysis Dipstick, Automated    3. Annual physical exam  -     CBC Auto Differential  -     Comprehensive Metabolic Panel  -     Lipid Panel With / Chol / HDL Ratio  -     TSH  -     Urinalysis With Microscopic - Urine, Clean Catch  -     PSA SCREENING  -     Hemoglobin A1c  -     Vitamin D,25-Hydroxy    4. Abnormal glucose  -     Hemoglobin A1c    5. Vitamin D deficiency  -     Vitamin D,25-Hydroxy    6. Prostate cancer screening  -     PSA SCREENING    7. Benign prostatic hyperplasia (BPH) with straining on urination  -     tamsulosin (FLOMAX) 0.4 MG capsule 24 hr capsule; Take 1 capsule by mouth Daily.  Dispense: 90 capsule; Refill: 1        Assessment & Plan  1. Benign Prostatic Hyperplasia (BPH).  He reports increased urinary frequency, slow flow, and occasional pain during urination, which have been ongoing since 2019. His PSA level from a year ago was not significantly elevated. A physical examination and lab work, including CBC, CMP, cholesterol, thyroid, urine, PSA screening, hemoglobin A1c, and vitamin D, will be scheduled within a month. A urine culture will be sent to the lab. Flomax 0.4 mg will be initiated. His PSA level will be checked in December 2024 or January 2025. If there is no increase, it will indicate minimal change in the prostate. If the urine culture results are abnormal, he will be contacted. If Flomax does not provide relief, a referral to Urology will be considered.    2. Prediabetes.  His hemoglobin A1c was previously checked due to his prediabetic status. He is advised to continue monitoring his blood sugar levels. Hemoglobin A1c will be included in the upcoming lab work.    3. Elevated Potassium.  His potassium level was slightly elevated previously. He was advised to repeat the potassium test in a month but missed it. The potassium test will be included in the upcoming lab work.                Follow Up   No follow-ups on file.  Patient was given instructions and counseling  regarding his condition or for health maintenance advice. Please see specific information pulled into the AVS if appropriate.     Patient or patient representative verbalized consent for the use of Ambient Listening during the visit with  Tyra Chacko MD for chart documentation. 12/3/2024  13:43 EST

## 2024-12-04 LAB
25(OH)D3+25(OH)D2 SERPL-MCNC: 86.4 NG/ML (ref 30–100)
ALBUMIN SERPL-MCNC: 4.2 G/DL (ref 3.5–5.2)
ALBUMIN/CREAT UR: <3 MG/G CREAT (ref 0–29)
ALBUMIN/GLOB SERPL: 1.7 G/DL
ALP SERPL-CCNC: 97 U/L (ref 39–117)
ALT SERPL-CCNC: 22 U/L (ref 1–41)
APPEARANCE UR: CLEAR
AST SERPL-CCNC: 19 U/L (ref 1–40)
BACTERIA #/AREA URNS HPF: ABNORMAL /HPF
BASOPHILS # BLD AUTO: 0.04 10*3/MM3 (ref 0–0.2)
BASOPHILS NFR BLD AUTO: 0.5 % (ref 0–1.5)
BILIRUB SERPL-MCNC: 0.6 MG/DL (ref 0–1.2)
BILIRUB UR QL STRIP: NEGATIVE
BUN SERPL-MCNC: 17 MG/DL (ref 8–23)
BUN/CREAT SERPL: 18.5 (ref 7–25)
CALCIUM SERPL-MCNC: 9.7 MG/DL (ref 8.6–10.5)
CASTS URNS MICRO: ABNORMAL
CHLORIDE SERPL-SCNC: 104 MMOL/L (ref 98–107)
CHOLEST SERPL-MCNC: 161 MG/DL (ref 0–200)
CHOLEST/HDLC SERPL: 2.56 {RATIO}
CO2 SERPL-SCNC: 27.5 MMOL/L (ref 22–29)
COLOR UR: YELLOW
CREAT SERPL-MCNC: 0.92 MG/DL (ref 0.76–1.27)
CREAT UR-MCNC: 94.3 MG/DL
EGFRCR SERPLBLD CKD-EPI 2021: 93.5 ML/MIN/1.73
EOSINOPHIL # BLD AUTO: 0.24 10*3/MM3 (ref 0–0.4)
EOSINOPHIL NFR BLD AUTO: 3.2 % (ref 0.3–6.2)
EPI CELLS #/AREA URNS HPF: ABNORMAL /HPF
ERYTHROCYTE [DISTWIDTH] IN BLOOD BY AUTOMATED COUNT: 11.9 % (ref 12.3–15.4)
GLOBULIN SER CALC-MCNC: 2.5 GM/DL
GLUCOSE SERPL-MCNC: 119 MG/DL (ref 65–99)
GLUCOSE UR QL STRIP: NEGATIVE
HBA1C MFR BLD: 5.9 % (ref 4.8–5.6)
HCT VFR BLD AUTO: 46.6 % (ref 37.5–51)
HDLC SERPL-MCNC: 63 MG/DL (ref 40–60)
HGB BLD-MCNC: 15.5 G/DL (ref 13–17.7)
HGB UR QL STRIP: NEGATIVE
IMM GRANULOCYTES # BLD AUTO: 0.01 10*3/MM3 (ref 0–0.05)
IMM GRANULOCYTES NFR BLD AUTO: 0.1 % (ref 0–0.5)
KETONES UR QL STRIP: ABNORMAL
LDLC SERPL CALC-MCNC: 86 MG/DL (ref 0–100)
LEUKOCYTE ESTERASE UR QL STRIP: NEGATIVE
LYMPHOCYTES # BLD AUTO: 2.96 10*3/MM3 (ref 0.7–3.1)
LYMPHOCYTES NFR BLD AUTO: 39.5 % (ref 19.6–45.3)
MCH RBC QN AUTO: 30.9 PG (ref 26.6–33)
MCHC RBC AUTO-ENTMCNC: 33.3 G/DL (ref 31.5–35.7)
MCV RBC AUTO: 92.8 FL (ref 79–97)
MICROALBUMIN UR-MCNC: <3 UG/ML
MONOCYTES # BLD AUTO: 0.66 10*3/MM3 (ref 0.1–0.9)
MONOCYTES NFR BLD AUTO: 8.8 % (ref 5–12)
NEUTROPHILS # BLD AUTO: 3.58 10*3/MM3 (ref 1.7–7)
NEUTROPHILS NFR BLD AUTO: 47.9 % (ref 42.7–76)
NITRITE UR QL STRIP: NEGATIVE
NRBC BLD AUTO-RTO: 0 /100 WBC (ref 0–0.2)
PH UR STRIP: 5.5 [PH] (ref 5–8)
PLATELET # BLD AUTO: 273 10*3/MM3 (ref 140–450)
POTASSIUM SERPL-SCNC: 5.3 MMOL/L (ref 3.5–5.2)
PROT SERPL-MCNC: 6.7 G/DL (ref 6–8.5)
PROT UR QL STRIP: NEGATIVE
PSA SERPL-MCNC: 0.63 NG/ML (ref 0–4)
RBC # BLD AUTO: 5.02 10*6/MM3 (ref 4.14–5.8)
RBC #/AREA URNS HPF: ABNORMAL /HPF
SODIUM SERPL-SCNC: 140 MMOL/L (ref 136–145)
SP GR UR STRIP: 1.02 (ref 1–1.03)
TRIGL SERPL-MCNC: 61 MG/DL (ref 0–150)
TSH SERPL DL<=0.005 MIU/L-ACNC: 0.95 UIU/ML (ref 0.27–4.2)
UROBILINOGEN UR STRIP-MCNC: ABNORMAL MG/DL
VLDLC SERPL CALC-MCNC: 12 MG/DL (ref 5–40)
WBC # BLD AUTO: 7.49 10*3/MM3 (ref 3.4–10.8)
WBC #/AREA URNS HPF: ABNORMAL /HPF

## 2024-12-09 ENCOUNTER — OFFICE VISIT (OUTPATIENT)
Dept: FAMILY MEDICINE CLINIC | Facility: CLINIC | Age: 64
End: 2024-12-09
Payer: COMMERCIAL

## 2024-12-09 VITALS
RESPIRATION RATE: 16 BRPM | DIASTOLIC BLOOD PRESSURE: 76 MMHG | BODY MASS INDEX: 27.35 KG/M2 | SYSTOLIC BLOOD PRESSURE: 110 MMHG | OXYGEN SATURATION: 97 % | WEIGHT: 201.9 LBS | HEART RATE: 111 BPM | HEIGHT: 72 IN | TEMPERATURE: 98.6 F

## 2024-12-09 DIAGNOSIS — N40.1 BENIGN PROSTATIC HYPERPLASIA (BPH) WITH STRAINING ON URINATION: ICD-10-CM

## 2024-12-09 DIAGNOSIS — R39.16 BENIGN PROSTATIC HYPERPLASIA (BPH) WITH STRAINING ON URINATION: ICD-10-CM

## 2024-12-09 DIAGNOSIS — E78.5 DYSLIPIDEMIA: ICD-10-CM

## 2024-12-09 DIAGNOSIS — I42.9 CARDIOMYOPATHY, UNSPECIFIED TYPE: ICD-10-CM

## 2024-12-09 DIAGNOSIS — E87.5 HYPERKALEMIA: ICD-10-CM

## 2024-12-09 DIAGNOSIS — Z00.00 ANNUAL PHYSICAL EXAM: Primary | ICD-10-CM

## 2024-12-09 PROCEDURE — 99396 PREV VISIT EST AGE 40-64: CPT | Performed by: STUDENT IN AN ORGANIZED HEALTH CARE EDUCATION/TRAINING PROGRAM

## 2024-12-09 PROCEDURE — 93000 ELECTROCARDIOGRAM COMPLETE: CPT | Performed by: STUDENT IN AN ORGANIZED HEALTH CARE EDUCATION/TRAINING PROGRAM

## 2024-12-10 PROCEDURE — 93000 ELECTROCARDIOGRAM COMPLETE: CPT | Performed by: STUDENT IN AN ORGANIZED HEALTH CARE EDUCATION/TRAINING PROGRAM

## 2024-12-10 NOTE — PROGRESS NOTES
"Chief Complaint  Annual Exam and Abnormal Lab (FROM 12/04/2024.)    Subjective    History of Present Illness  The patient is a 64-year-old gentleman here for a physical examination.    He reports no chest pain or palpitations. He does not experience any throat pain. He has noticed swelling in his foot, which he attributes to prolonged sitting during travel. He does not have any abdominal pain or constipation.    He has recently started taking tamsulosin, which he believes has improved his urinary symptoms.    His diet includes a high intake of fruits such as apples, grapes, bananas, cherries, and watermelon. He also enjoys mushrooms and cucumbers but does not consume lentils or potatoes.    He underwent a colonoscopy in 2017. He uses a hearing aid in his right ear, which he finds beneficial.    SOCIAL HISTORY  He quit smoking 10 years ago. He used to smoke 5 to 6 cigarettes a day. He started smoking at the age of 16.    FAMILY HISTORY  His grandfather and father had leg swelling.       Tripp Melchor presents to [facility]  Abnormal Lab      Objective   Vital Signs:  /76 (BP Location: Left arm, Patient Position: Sitting, Cuff Size: Adult)   Pulse 111   Temp 98.6 °F (37 °C) (Oral)   Resp 16   Ht 182.9 cm (72\")   Wt 91.6 kg (201 lb 14.4 oz)   SpO2 97%   BMI 27.38 kg/m²   Estimated body mass index is 27.38 kg/m² as calculated from the following:    Height as of this encounter: 182.9 cm (72\").    Weight as of this encounter: 91.6 kg (201 lb 14.4 oz).            Physical Exam  HENT:      Head: Normocephalic and atraumatic.      Mouth/Throat:      Mouth: Mucous membranes are moist.      Pharynx: Oropharynx is clear.   Eyes:      Extraocular Movements: Extraocular movements intact.      Conjunctiva/sclera: Conjunctivae normal.      Pupils: Pupils are equal, round, and reactive to light.   Cardiovascular:      Rate and Rhythm: Normal rate and regular rhythm.      Pulses: Normal " pulses.      Heart sounds: Normal heart sounds.   Pulmonary:      Effort: No respiratory distress.      Breath sounds: Normal breath sounds. No wheezing.   Chest:      Chest wall: No tenderness.   Abdominal:      General: Bowel sounds are normal.      Palpations: Abdomen is soft.      Tenderness: There is no abdominal tenderness. There is no right CVA tenderness, left CVA tenderness or guarding.   Musculoskeletal:         General: Normal range of motion.      Cervical back: Normal range of motion and neck supple.   Skin:     General: Skin is warm.      Capillary Refill: Capillary refill takes less than 2 seconds.   Neurological:      General: No focal deficit present.      Mental Status: He is alert and oriented to person, place, and time. Mental status is at baseline.   Psychiatric:         Mood and Affect: Mood normal.         Behavior: Behavior normal.        Result Review :           ECG 12 Lead    Date/Time: 12/10/2024 5:53 AM  Performed by: Tyra Chacko MD    Authorized by: Tyra Chacko MD  Comparison: compared with previous ECG from 9/6/2022  Similar to previous ECG  Comparison to previous ECG: No new findings  Rhythm: sinus rhythm    Clinical impression: non-specific ECG            Assessment and Plan   Diagnoses and all orders for this visit:    1. Annual physical exam (Primary)    2. Hyperkalemia  -     ECG 12 Lead  -     CBC Auto Differential; Future  -     Comprehensive Metabolic Panel; Future  -     Lipid Panel With / Chol / HDL Ratio; Future  -     Urinalysis With Microscopic - Urine, Clean Catch; Future    3. Benign prostatic hyperplasia (BPH) with straining on urination  -     CBC Auto Differential; Future  -     Comprehensive Metabolic Panel; Future  -     Lipid Panel With / Chol / HDL Ratio; Future  -     Urinalysis With Microscopic - Urine, Clean Catch; Future    4. Dyslipidemia  -     CBC Auto Differential; Future  -     Comprehensive Metabolic Panel; Future  -     Lipid Panel With / Chol  / HDL Ratio; Future  -     Urinalysis With Microscopic - Urine, Clean Catch; Future    5. Cardiomyopathy, unspecified type  -     CBC Auto Differential; Future  -     Comprehensive Metabolic Panel; Future  -     Lipid Panel With / Chol / HDL Ratio; Future  -     Urinalysis With Microscopic - Urine, Clean Catch; Future        Assessment & Plan  1. Tachycardia.  His heart rate is elevated at 111 bpm, deviating from his usual rate of around 70 bpm. He reports no symptoms such as palpitations or chest pain. An EKG will be performed today to establish a baseline and rule out any underlying issues.    2. Hyperkalemia.  His potassium levels have improved but remain slightly high at 5.3. His kidney function is normal, and he is not consuming high-potassium foods. He is currently on Enalapril 2.5 mg, which can increase potassium levels. If his potassium levels continue to rise, a change in his Enalapril medication may be considered.    3. Well-controlled Hypertension.  His blood pressure is well managed with his current medication regimen, including Enalapril 2.5 mg. No changes to his hypertension management are necessary at this time.    4. Benign Prostatic Hyperplasia (BPH).  He started Tamsulosin (Flomax) three days ago and reports some improvement in urinary symptoms. His urine test came out clear, and his PSA level remains stable at 0.6. Continued use of Tamsulosin is recommended.    5. Prediabetes.  His hemoglobin A1c is 5.9, indicating good blood sugar control. He is advised to avoid processed sugar and use dates as a natural sweetener. All fruit sugars are considered acceptable. He is at high risk for developing diabetes but his condition is not worsening.    6. Leg Swelling.  He reports leg swelling, which was observed during the visit. The swelling is likely due to prolonged sitting. No immediate intervention is required, but monitoring is advised.    7. Health Maintenance.  He declined the influenza vaccine and  does not wish to receive the COVID-19 vaccine. His next colonoscopy is scheduled for 2027, based on a previous colonoscopy done in 2017 with no polyps found.    Patient encouraged to partake of healthy diet rich in fresh fruits and vegetables as well as lean proteins.  Patient encouraged to participate in daily exercise with goal of 30 min sustained activity.  Wear seatbelt when driving  Flu shot annually              Follow Up   No follow-ups on file.  Patient was given instructions and counseling regarding his condition or for health maintenance advice. Please see specific information pulled into the AVS if appropriate.     Patient or patient representative verbalized consent for the use of Ambient Listening during the visit with  Tyra Chacko MD for chart documentation. 12/10/2024  05:51 EST

## 2025-02-04 ENCOUNTER — OFFICE VISIT (OUTPATIENT)
Dept: FAMILY MEDICINE CLINIC | Facility: CLINIC | Age: 65
End: 2025-02-04
Payer: COMMERCIAL

## 2025-02-04 VITALS
DIASTOLIC BLOOD PRESSURE: 70 MMHG | RESPIRATION RATE: 16 BRPM | OXYGEN SATURATION: 97 % | HEIGHT: 72 IN | WEIGHT: 199.2 LBS | TEMPERATURE: 96.8 F | HEART RATE: 72 BPM | BODY MASS INDEX: 26.98 KG/M2 | SYSTOLIC BLOOD PRESSURE: 122 MMHG

## 2025-02-04 DIAGNOSIS — H93.11 TINNITUS, RIGHT: ICD-10-CM

## 2025-02-04 DIAGNOSIS — R42 VERTIGO: Primary | ICD-10-CM

## 2025-02-04 DIAGNOSIS — H90.41 SENSORINEURAL HEARING LOSS (SNHL) OF RIGHT EAR WITH UNRESTRICTED HEARING OF LEFT EAR: ICD-10-CM

## 2025-02-04 PROCEDURE — 99213 OFFICE O/P EST LOW 20 MIN: CPT | Performed by: STUDENT IN AN ORGANIZED HEALTH CARE EDUCATION/TRAINING PROGRAM

## 2025-02-04 NOTE — PROGRESS NOTES
Office Note     Name: Tripp Melchor    : 1960     MRN: [id number]     Chief Complaint  Dizziness    Subjective     History of Present Illness:  Tripp Melchor is a 64 y.o. male     History of Present Illness  The patient is a 64-year-old male who presents today with complaints of intermittent dizziness, vertigo, and nausea.    He began experiencing episodes of dizziness approximately 2 years ago, accompanied by a hissing noise in his ear. These symptoms were reminiscent of vertigo. Initially, the episodes occurred once every 6 months but have increased in frequency to almost daily over the past week. The severity of these episodes has decreased, but he still experiences a sensation of impending dizziness. He reports no changes in vision or headaches. Over the past week, he has experienced nausea during these episodes but has not vomited. He also reports no chest pain or shortness of breath. The dizziness typically starts around 11:00 AM but has recently been occurring between 7:00 and 8:00 AM. He has noticed that the dizziness is more pronounced when he is looking at a monitor. He does not experience a sensation of the room spinning during these episodes but feels unsteady on his feet and needs assistance to walk. He does not feel lightheaded during these episodes. He describes the dizziness as a sensation of things spinning around him. He is uncertain if the Bloomfield-Hallpike maneuver has been performed on him. He has not undergone vestibular rehabilitation.    Approximately 6 months later, he experienced significant hearing loss in his right ear, estimated at 60 to 70 percent. He has consulted with 3 ENT specialists and has undergone a brain scan. His last ENT consultation was in 2024 at [facility]. He has not consulted with any ENT specialists locally. He experiences ringing in his ears when he removes his hearing aid. Three ENT specialists have ruled out Meniere's disease. He has  received 15 to 20 steroid injections, which have alleviated his dizziness.       Past Medical History:   Past Medical History:   Diagnosis Date    Coronary artery disease 09/21/2021    Heart attack     HL (hearing loss)        Past Surgical History:   Past Surgical History:   Procedure Laterality Date    APPENDECTOMY  1981       Immunizations:   Immunization History   Administered Date(s) Administered    COVID-19 (PFIZER) Purple Cap Monovalent 02/18/2021, 03/08/2021, 03/17/2021, 03/29/2021, 10/18/2021    Flu Vaccine Intradermal Quad 18-64YR 10/03/2022    Influenza Injectable Mdck Pf Quad 12/28/2023    Influenza, Unspecified 03/16/2023    Shingrix 10/03/2022, 03/16/2023    Tdap 10/03/2022        Medications:     Current Outpatient Medications:     atorvastatin (Lipitor) 80 MG tablet, Take 1 tablet by mouth Daily., Disp: 90 tablet, Rfl: 4    carvedilol (COREG) 3.125 MG tablet, Take 1 tablet by mouth 2 (Two) Times a Day With Meals., Disp: , Rfl:     enalapril (VASOTEC) 2.5 MG tablet, Take 1 tablet by mouth Daily., Disp: , Rfl:     hydroCHLOROthiazide (HYDRODIURIL) 25 MG tablet, Take 1 tablet by mouth Every Morning., Disp: , Rfl:     tamsulosin (FLOMAX) 0.4 MG capsule 24 hr capsule, Take 1 capsule by mouth Daily., Disp: 90 capsule, Rfl: 1    vitamin D (ERGOCALCIFEROL) 1.25 MG (34059 UT) capsule capsule, Take 1 capsule by mouth 1 (One) Time Per Week., Disp: 12 capsule, Rfl: 4    Allergies:   No Known Allergies    Family History:   Family History   Problem Relation Age of Onset    Heart disease Father     Heart attack Father     Glaucoma Father     Vision loss Father     Cancer Sister         Breast cancer       Social History:   Social History     Socioeconomic History    Marital status: Single   Tobacco Use    Smoking status: Former     Passive exposure: Past    Smokeless tobacco: Never   Vaping Use    Vaping status: Never Used   Substance and Sexual Activity    Alcohol use: Yes     Alcohol/week: 2.0 standard drinks of  "alcohol     Types: 1 Glasses of wine, 1 Cans of beer per week     Comment: caffeine use     Drug use: Never    Sexual activity: Yes     Partners: Female     Birth control/protection: I.U.D.         Objective     Vital Signs  Ht 182.9 cm (72.01\")   BMI 27.38 kg/m²   Estimated body mass index is 27.38 kg/m² as calculated from the following:    Height as of this encounter: 182.9 cm (72.01\").    Weight as of 12/9/24: 91.6 kg (201 lb 14.4 oz).            Physical Exam  Constitutional:       General: He is not in acute distress.  HENT:      Head: Normocephalic and atraumatic.   Pulmonary:      Effort: Pulmonary effort is normal. No respiratory distress.   Musculoskeletal:      Right lower leg: No edema.      Left lower leg: No edema.   Skin:     Findings: No rash.   Neurological:      General: No focal deficit present.      Mental Status: He is alert and oriented to person, place, and time.   Psychiatric:         Mood and Affect: Mood normal.         Behavior: Behavior normal.         Thought Content: Thought content normal.         Judgment: Judgment normal.          Assessment and Plan     Assessment & Plan  1. Intermittent dizziness and vertigo.  The patient reports experiencing intermittent dizziness and vertigo for over 2 years, with an increase in frequency to almost daily over the past week. Symptoms include a sensation of spinning and difficulty walking without assistance. 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 The patient has previously seen multiple ENT specialists and had imaging done. The patient has not undergone the Veronica-Hallpike maneuver or vestibular rehab therapy. A referral to an ENT specialist will be made for further evaluation, including the Veronica-Hallpike maneuver to rule out BPPV. A referral for physical therapy, specifically vestibular rehab therapy, will also be initiated.    2. Nausea.  The patient reports " experiencing nausea, particularly during episodes of dizziness, but has not vomited.    3. Sensorineural hearing loss.  The patient reports a significant loss of hearing in the right ear, approximately 60-70%. The patient has previously seen multiple ENT specialists and had imaging done. A referral to an ENT specialist will be made for further evaluation.    PROCEDURE  The patient has received 15 to 20 steroid injections in the past, which have alleviated his dizziness.         Follow Up  No follow-ups on file.            Sajan Fam MD   [facility]  [address] [place] [id number] [phone]    Patient or patient representative verbalized consent for the use of Ambient Listening during the visit with  Sajan Fam MD for chart documentation. 2/4/2025  11:58 EST

## 2025-02-10 ENCOUNTER — TREATMENT (OUTPATIENT)
Dept: PHYSICAL THERAPY | Facility: CLINIC | Age: 65
End: 2025-02-10
Payer: COMMERCIAL

## 2025-02-10 DIAGNOSIS — H83.2X1 VESTIBULAR HYPOFUNCTION OF RIGHT EAR: ICD-10-CM

## 2025-02-10 DIAGNOSIS — R42 VERTIGO: Primary | ICD-10-CM

## 2025-02-10 PROCEDURE — 97530 THERAPEUTIC ACTIVITIES: CPT | Performed by: PHYSICAL THERAPIST

## 2025-02-10 PROCEDURE — 97112 NEUROMUSCULAR REEDUCATION: CPT | Performed by: PHYSICAL THERAPIST

## 2025-02-10 PROCEDURE — 97161 PT EVAL LOW COMPLEX 20 MIN: CPT | Performed by: PHYSICAL THERAPIST

## 2025-02-10 NOTE — PROGRESS NOTES
Physical Therapy Vestibular Initial Evaluation and Plan of Care  [facility] [place]  [address], [address]  [place] [id number]      Patient Name: Tripp Melchor       Patient MRN: [id number]  : 1960  PHYSICIAN: Sajan Fam MD  NPI: [id number]                                     Date: 2/10/2025  Encounter Diagnoses   Name Primary?    Vertigo Yes    Vestibular hypofunction of right ear        Subjective:    Subjective Outcome Measures  Dizziness Handicap Inventory: Minimal Perception of having a handicap (26/100)     Dizziness began 2022. Has had steroid injections in right ear. Ringing in right ear, and hearing loss. Has difficulty with balance. No falls, just feels unsteady. When it is very severe can have nausea and vomiting. No vestibular PT in the past.       Objective Testing:     Positional Testing  Positional Testing: With infrared goggles  Vertebrobasilar Artery Screen - Right: Negative  Vertebrobasilar Artery Screen - Left: Negative  Edison-Hallpike Right: No nystagmus  Edison-Hallpike Left: No nystagmus  Horizontal Roll Test Right: No nystagmus  Horizontal Roll Test Left: No nystagmus     Occulomotor Exam Fixation Present  Occular ROM: Normal  Spontaneous Nystagmus: Absent  Gaze-induced Nystagmus: Absent  Smooth Pursuit: Right:, Saccadic  Saccades: Right:, Overshoots  Convergence: Right:, Abnormal  VOR with Occulomotor Exam Fixation Absent   Spontaneous Nystagmus: Absent       THERAPY ASSESSMENT: Patient is a 64 y.o. male. Patient presents to physical therapy due to complaints of episodes of dizziness/vertigo that started in .  Signs and symptoms are consistent with right sided vestibular hypofunction.  Patient is a good candidate for physical therapy to address the following:    Functional Limitations: walking, difficulty moving, decreased ability to perform ADL's  Frequency/Duration: 1x per week for 6 weeks  PT interventions: canal  repositioning procedure, home exercise program, vestibular strengthening  Patient agrees with POC: Yes     History # of Personal Factors and/or Comorbidities: MODERATE (1-2)  Examination of Body System(s): # of elements: LOW (1-2)  Clinical Presentation: EVOLVING  Clinical Decision Making: LOW       REHAB POTENTIAL: good            SHORT TERM GOALS: To be met in 2 weeks:  1. Patient is independent with HEP.  2. Patient will report at least 30% improvement in overall condition.  3. Patient will report no falls.  LONG TERM GOALS:To be met in 4 weeks:  1. Patient will report decreased disequilibrium/dizziness by at least 90% which demonstrates improved quality of life.  2. Patient will report no loss of balance with ADLs to demonstrate improved functional balance and reduced risk for falls.  3. Patient denies dizziness with daily activity especially with transitional movements.  4. DHI score is less than 10 to demonstrate improved balance and dizziness.       Timed:         Neuromuscular Blanco:    15    mins  10252    Therapeutic Activity:     8     mins  07912         Untimed:  Low Eval     20     Mins  88651  Re Eval     0     Mins  14281  Canalith Repos    0     mins  17433      Timed Treatment:   23   mins   Total Treatment:     43   mins      PT SIGNATURE: Windy Bobby PT, DPT   [place] license #[id number]  DATE TREATMENT INITIATED: 2/10/2025     Initial Certification  Certification Period: 2/10/2025 thru 5/10/2025  I certify that the therapy services are furnished while this patient is under my care.  The services outlined above are required by this patient, and will be reviewed every 90 days.     PHYSICIAN: Sajan Fam MD      DATE:     Please sign and return via fax to [phone].. Thank you, [facility].

## 2025-02-24 ENCOUNTER — TREATMENT (OUTPATIENT)
Dept: PHYSICAL THERAPY | Facility: CLINIC | Age: 65
End: 2025-02-24
Payer: COMMERCIAL

## 2025-02-24 DIAGNOSIS — H83.2X1 VESTIBULAR HYPOFUNCTION OF RIGHT EAR: ICD-10-CM

## 2025-02-24 DIAGNOSIS — R42 VERTIGO: Primary | ICD-10-CM

## 2025-02-24 PROCEDURE — 97110 THERAPEUTIC EXERCISES: CPT | Performed by: PHYSICAL THERAPIST

## 2025-02-24 PROCEDURE — 97530 THERAPEUTIC ACTIVITIES: CPT | Performed by: PHYSICAL THERAPIST

## 2025-02-24 NOTE — PATIENT INSTRUCTIONS
Access Code: [email or web address]  URL: [email or web address]  Date: 02/24/2025  Prepared by: Windy Bobby    Exercises  - Walking Gaze Stabilization Head Nod  - 1 x daily - 4 reps  - Walking Gaze Stabilization Head Rotation  - 1 x daily - 4 reps  - Eye Arch Rolls  - 1 x daily - 2 sets - 10 reps - 5 weekly  - Neck Rotation  - 1 x daily - 2 sets - 10 reps

## 2025-02-24 NOTE — PROGRESS NOTES
Physical Therapy Daily Progress Note-Vestibular Rehab  [facility] [place]  [address]  [facility] [address]        Patient: Tripp Melchor   : 1960  Referring practitioner: Sajan Fam MD  Date of Initial Visit: Type: THERAPY  Noted: 2/10/2025  Today's Date: 2025  Patient seen for 2 sessions       Visit Diagnoses:    ICD-10-CM ICD-9-CM   1. Vertigo  R42 780.4   2. Vestibular hypofunction of right ear  H83.2X1 386.50           Subjective:  Tripp Melchor reports: he notices symptoms when wearing headphone on left ear only during teams meetings. Did not do exercises at home.       Objective       See Exercise and Vestibular Logs for complete testing and treatment.       Assessment:  New exercises provoke symptoms, added to HEP with instruction to push 10-15 seconds past symptom onset.       Plan:   Progress vestibular strengthening exercises as tolerated.           Timed:         Neuromuscular Blanco:     10    mins  98253;    Therapeutic Activity:      10     mins  29752;           Un-Timed:  Canalith Repositioninig        0    mins 17066      Timed Treatment:   20   mins   Total Treatment:     20   mins        Windy Bobby PT, DPT  Physical Therapist  [place] license # [id number]

## 2025-03-25 ENCOUNTER — TREATMENT (OUTPATIENT)
Dept: PHYSICAL THERAPY | Facility: CLINIC | Age: 65
End: 2025-03-25
Payer: COMMERCIAL

## 2025-03-25 DIAGNOSIS — R42 VERTIGO: Primary | ICD-10-CM

## 2025-03-25 DIAGNOSIS — H83.2X1 VESTIBULAR HYPOFUNCTION OF RIGHT EAR: ICD-10-CM

## 2025-03-25 PROCEDURE — 97164 PT RE-EVAL EST PLAN CARE: CPT | Performed by: PHYSICAL THERAPIST

## 2025-03-25 NOTE — PROGRESS NOTES
Physical Therapy Monthly Progress Note  [facility] [place]  [address], [address]  [place] [address]    Name: Tripp Melchor  Date: 03/25/2025  Diagnosis/ICD-10 Code:  Vertigo [R42]  Referring practitioner: Sajan Fam MD  Date of Initial Visit:   Visit #: 3  Subjective:   Tripp Melchor reports: symptoms have completely resolved  Subjective Questionnaire: DHI: 18/100,  improved from 26/100  Clinical Progress: improved  Home Program Compliance: Yes  Treatment has included: neuromuscular re-education and therapeutic activity  Objective:   Objective             Occulomotor Exam Fixation Present  Occular ROM: Normal  Spontaneous Nystagmus: Absent  Gaze-induced Nystagmus: Absent  Smooth Pursuit: Normal  Saccades: Intact  Convergence: Normal              Assessment:    Symptoms have completely resolved, and testing is normal. Appropriate to D/C at this time.     Plan:      SHORT TERM GOALS: To be met in 2 weeks:  1. Patient is independent with HEP. (MET)  2. Patient will report at least 30% improvement in overall condition. (MET)  3. Patient will report no falls. (MET)    LONG TERM GOALS:To be met in 4 weeks:  1. Patient will report decreased disequilibrium/dizziness by at least 90% which demonstrates improved quality of life. (MET)  2. Patient will report no loss of balance with ADLs to demonstrate improved functional balance and reduced risk for falls. (MET)  3. Patient denies dizziness with daily activity especially with transitional movements. (MET)  4. DHI score is less than 10 to demonstrate improved balance and dizziness. (PROGRESSED)    Progress toward previous goals: Partially Met  Recommendations: Discharge  Timeframe: 1 month  Prognosis to achieve goals: good    03/25/2025 Treatments:   20    Timed Code Treatment: 0   Minutes     Total Treatment Time: 20      Minutes    PT: Windy Bobby PT, DPT  License Number: [id number]  Electronically signed by Windy Bobby PT,  03/25/25, 7:04 AM EDT

## 2025-04-10 ENCOUNTER — OFFICE VISIT (OUTPATIENT)
Dept: FAMILY MEDICINE CLINIC | Facility: CLINIC | Age: 65
End: 2025-04-10
Payer: COMMERCIAL

## 2025-04-10 VITALS
HEIGHT: 72 IN | HEART RATE: 82 BPM | WEIGHT: 199.7 LBS | RESPIRATION RATE: 16 BRPM | BODY MASS INDEX: 27.05 KG/M2 | OXYGEN SATURATION: 96 % | TEMPERATURE: 98.5 F

## 2025-04-10 DIAGNOSIS — R39.16 BENIGN PROSTATIC HYPERPLASIA (BPH) WITH STRAINING ON URINATION: Primary | ICD-10-CM

## 2025-04-10 DIAGNOSIS — I95.2 HYPOTENSION DUE TO DRUGS: ICD-10-CM

## 2025-04-10 DIAGNOSIS — R42 DIZZINESS: ICD-10-CM

## 2025-04-10 DIAGNOSIS — N40.1 BENIGN PROSTATIC HYPERPLASIA (BPH) WITH STRAINING ON URINATION: Primary | ICD-10-CM

## 2025-04-10 DIAGNOSIS — E87.5 HYPERKALEMIA: ICD-10-CM

## 2025-04-10 PROCEDURE — 99214 OFFICE O/P EST MOD 30 MIN: CPT | Performed by: STUDENT IN AN ORGANIZED HEALTH CARE EDUCATION/TRAINING PROGRAM

## 2025-04-17 NOTE — PROGRESS NOTES
"Chief Complaint  Urinary Retention (On going issue.)    Subjective    History of Present Illness  The patient is a 64-year-old male who presents for evaluation of urinary issues, dizziness, and blood pressure management.    He reports that the prescribed Flomax has not been effective in managing his urinary symptoms. He describes a weak urinary stream, necessitating exertion to facilitate urination. The flow is intermittent, requiring repeated pushing to maintain. He has completed a 30-day course of Flomax without any noticeable improvement. He does not experience any burning sensation during urination. He recalls having similar symptoms in 12/2024, which have progressively worsened over time.    He has not consulted another ENT specialist. He has undergone physical therapy, which he reports as ineffective. The therapist attempted to identify potential triggers related to eye movements and walking, but no significant findings were made. He has been informed that he may be leaving the [facility] in [place]. His ENT doctor does not believe he has Meniere's disease, as all tests have been exhausted without conclusive results. He has accepted his hearing loss but experiences intermittent dizziness. He does not monitor his blood pressure during these episodes. He is not experiencing dizziness today. He is not on any antihypertensive medications. He recalls a period of discontinuing all medications for 3 months over a year ago, which did not result in any changes.    He is currently on carvedilol and enalapril, as recommended by his cardiologist, and hydrochlorothiazide, which he has been taking for an extended period.    MEDICATIONS  Current: Flomax, carvedilol, enalapril, hydrochlorothiazide       Tripp Melchor presents to [facility]  Urinary Retention      Objective   Vital Signs:  Pulse 82   Temp 98.5 °F (36.9 °C) (Oral)   Resp 16   Ht 182.9 cm (72\")   Wt 90.6 kg (199 lb 11.2 " "oz)   SpO2 96%   BMI 27.08 kg/m²   Estimated body mass index is 27.08 kg/m² as calculated from the following:    Height as of this encounter: 182.9 cm (72\").    Weight as of this encounter: 90.6 kg (199 lb 11.2 oz).            Physical Exam  Cardiovascular:      Rate and Rhythm: Normal rate.      Pulses: Normal pulses.      Heart sounds: Normal heart sounds.   Pulmonary:      Effort: Pulmonary effort is normal.      Breath sounds: Normal breath sounds.   Abdominal:      General: Bowel sounds are normal.      Palpations: Abdomen is soft.   Neurological:      Mental Status: He is alert.        Result Review :                Assessment and Plan   Diagnoses and all orders for this visit:    1. Benign prostatic hyperplasia (BPH) with straining on urination (Primary)  -     Ambulatory Referral to Urology    2. Hyperkalemia  -     Basic metabolic panel; Future    3. Dizziness    4. Hypotension due to drugs        Assessment & Plan  1. Urinary issues.  He reports that Flomax has not been effective in managing his urinary symptoms, including weak urine flow and the need to push during urination. A referral to a urologist will be initiated for further evaluation. Additionally, a urine sample will be sent to the lab to rule out any potential infection.    2. Dizziness.  His dizziness may be attributed to low blood pressure, potentially induced by his current medication regimen. He is advised to monitor his blood pressure during episodes of dizziness. The dosage of hydrochlorothiazide will be reduced from 25 mg to half a pill. The current regimen of Coreg and enalapril will be maintained at the same dosage. A non-fasting lab order will be placed to monitor his potassium levels, which were borderline during the last check.    3. Blood pressure management.  His blood pressure today is 96/60, which is lower than his usual readings. He is currently taking carvedilol and enalapril as recommended by his cardiologist, and " hydrochlorothiazide, which he has been taking for a long time. The dosage of hydrochlorothiazide will be reduced from 25 mg to half a pill. The current regimen of Coreg and enalapril will be maintained at the same dosage.    Follow-up  The patient will follow up in 1 month.            Follow Up   No follow-ups on file.  Patient was given instructions and counseling regarding his condition or for health maintenance advice. Please see specific information pulled into the AVS if appropriate.     Patient or patient representative verbalized consent for the use of Ambient Listening during the visit with  Tyra Chacko MD for chart documentation. 4/17/2025  12:58 EDT

## 2025-05-05 ENCOUNTER — TELEPHONE (OUTPATIENT)
Dept: FAMILY MEDICINE CLINIC | Facility: CLINIC | Age: 65
End: 2025-05-05
Payer: COMMERCIAL

## 2025-05-05 NOTE — TELEPHONE ENCOUNTER
Caller: Tripp Melchor    Relationship to patient: Self    Best call back number: [phone]     Type of visit: LABS    If rescheduling, when is the original appointment: 05-     Additional notes:PLEASE CALL TO RESCHEDULE

## 2025-05-05 NOTE — TELEPHONE ENCOUNTER
Hub staff attempted to follow warm transfer process and was unsuccessful     Caller: Tripp Melchor    Relationship to patient: Self    Best call back number: [phone]    Patient is needing: PT CALLING TO R/S TODAY'S LAB APPT.

## 2025-05-12 ENCOUNTER — OFFICE VISIT (OUTPATIENT)
Dept: FAMILY MEDICINE CLINIC | Facility: CLINIC | Age: 65
End: 2025-05-12
Payer: COMMERCIAL

## 2025-05-12 VITALS
HEIGHT: 72 IN | TEMPERATURE: 98.2 F | DIASTOLIC BLOOD PRESSURE: 54 MMHG | OXYGEN SATURATION: 97 % | HEART RATE: 78 BPM | BODY MASS INDEX: 27.43 KG/M2 | WEIGHT: 202.5 LBS | RESPIRATION RATE: 16 BRPM | SYSTOLIC BLOOD PRESSURE: 104 MMHG

## 2025-05-12 DIAGNOSIS — N40.1 BENIGN PROSTATIC HYPERPLASIA (BPH) WITH STRAINING ON URINATION: ICD-10-CM

## 2025-05-12 DIAGNOSIS — I95.2 HYPOTENSION DUE TO DRUGS: Primary | ICD-10-CM

## 2025-05-12 DIAGNOSIS — R39.16 BENIGN PROSTATIC HYPERPLASIA (BPH) WITH STRAINING ON URINATION: ICD-10-CM

## 2025-05-12 DIAGNOSIS — R42 DIZZINESS: ICD-10-CM

## 2025-05-12 PROCEDURE — 99214 OFFICE O/P EST MOD 30 MIN: CPT | Performed by: STUDENT IN AN ORGANIZED HEALTH CARE EDUCATION/TRAINING PROGRAM

## 2025-05-12 RX ORDER — HYDROCHLOROTHIAZIDE 12.5 MG/1
12.5 TABLET ORAL DAILY
Qty: 90 TABLET | Refills: 0 | Status: SHIPPED | OUTPATIENT
Start: 2025-05-12

## 2025-05-12 NOTE — PROGRESS NOTES
"Chief Complaint  lab results    Subjective    History of Present Illness  The patient is a 64-year-old gentleman who presents for lab follow-up.    He reports no episodes of dizziness. He has been prescribed new eyeglasses with prism lenses to correct an inward turning of his pupils, as identified by his physical therapist. This adjustment was made approximately a month ago, and he remains hopeful that it will alleviate his dizziness, although no significant improvement has been observed to date. He does not experience any headaches associated with the use of these new glasses.    He is currently on a regimen of atorvastatin 80 mg daily, Coreg 3.125 mg twice daily with food, enalapril 2.5 mg daily, hydrochlorothiazide 25 mg daily, and vitamin D 5000 IU. He was previously prescribed vitamin D 50,000 IU in 2021.    Recent lab results show his potassium level has improved from 5.3 to 4.1, and his glucose level is now 97, which is below 100. Calcium levels are normal. He has been advised to take a break from vitamin D supplementation for 3 months and then resume with an over-the-counter daily pill of around 1000 units.    He has been attempting to manage his urinary flow by intermittently skipping his hydrochlorothiazide dose, but this has resulted in a deterioration of his symptoms, prompting him to resume the medication. He has a scheduled appointment with a urologist in 06/2025.       Tripp Melchor presents to [facility]  History of Present Illness    Objective   Vital Signs:  /54   Pulse 78   Temp 98.2 °F (36.8 °C) (Oral)   Resp 16   Ht 182.9 cm (72\")   Wt 91.9 kg (202 lb 8 oz)   SpO2 97%   BMI 27.46 kg/m²   Estimated body mass index is 27.46 kg/m² as calculated from the following:    Height as of this encounter: 182.9 cm (72\").    Weight as of this encounter: 91.9 kg (202 lb 8 oz).            Physical Exam  Cardiovascular:      Rate and Rhythm: Normal rate.      Pulses: " Normal pulses.      Heart sounds: Normal heart sounds.   Pulmonary:      Effort: Pulmonary effort is normal.      Breath sounds: Normal breath sounds.   Abdominal:      General: Bowel sounds are normal.      Palpations: Abdomen is soft.   Neurological:      Mental Status: He is alert and oriented to person, place, and time.        Result Review :                Assessment and Plan   Diagnoses and all orders for this visit:    1. Hypotension due to drugs (Primary)  -     hydroCHLOROthiazide 12.5 MG tablet; Take 1 tablet by mouth Daily.  Dispense: 90 tablet; Refill: 0    2. Dizziness  -     hydroCHLOROthiazide 12.5 MG tablet; Take 1 tablet by mouth Daily.  Dispense: 90 tablet; Refill: 0    3. Benign prostatic hyperplasia (BPH) with straining on urination        Assessment & Plan  1. Lab follow-up.  - Glucose levels have shown a slight increase, necessitating a low carbohydrate diet and increased physical activity.  - Potassium levels were previously elevated at 5.3 but have since improved to 4.1. Other electrolyte levels, as well as liver and kidney functions, are within normal ranges. Calcium levels are also normal.  - Advised to discontinue vitamin D supplementation for a period of 3 months, after which he may resume an over-the-counter daily dose of 1000 units.  - Instructed to continue his current medication regimen, including atorvastatin 80 mg daily, Coreg 3.125 mg twice daily with food, enalapril 2.5 mg daily, and hydrochlorothiazide 25 mg daily.    2. Hypertension.  - Blood pressure readings have been consistently low, with a recent reading of 104/54 and a recheck showing 92/58.  - Dosage of hydrochlorothiazide will be reduced from 25 mg to 12.5 mg to help manage blood pressure.  - A new prescription for hydrochlorothiazide 12.5 mg will be sent to the pharmacy. If blood pressure increases to above 110, it may help improve dizziness.    3. Dizziness.  - Reports ongoing dizziness, which may be related to low  blood pressure or other factors.  - Has new glasses with prism lenses to help correct pupil alignment, which may also help with dizziness.  - If dizziness persists despite these interventions, further evaluation will be necessary.  - Blood pressure management is ongoing to determine if it contributes to dizziness improvement.    4. Benign prostatic hyperplasia.  - Reports that skipping hydrochlorothiazide worsens urine flow.  - Clarified that hydrochlorothiazide acts as a diuretic and does not directly treat benign prostatic hyperplasia.  - Advised to continue taking hydrochlorothiazide at the adjusted dose of 12.5 mg and to follow up with a urology doctor as scheduled in 06/2025.  - Follow-up in 1 month for a blood pressure check.            Follow Up   No follow-ups on file.  Patient was given instructions and counseling regarding his condition or for health maintenance advice. Please see specific information pulled into the AVS if appropriate.     Patient or patient representative verbalized consent for the use of Ambient Listening during the visit with  Tyra Chacko MD for chart documentation. 5/12/2025  13:04 EDT

## 2025-06-12 ENCOUNTER — OFFICE VISIT (OUTPATIENT)
Dept: FAMILY MEDICINE CLINIC | Facility: CLINIC | Age: 65
End: 2025-06-12
Payer: COMMERCIAL

## 2025-06-12 VITALS
HEIGHT: 72 IN | RESPIRATION RATE: 16 BRPM | SYSTOLIC BLOOD PRESSURE: 98 MMHG | HEART RATE: 84 BPM | WEIGHT: 197.6 LBS | BODY MASS INDEX: 26.76 KG/M2 | DIASTOLIC BLOOD PRESSURE: 74 MMHG | OXYGEN SATURATION: 96 % | TEMPERATURE: 98.1 F

## 2025-06-12 DIAGNOSIS — E55.9 VITAMIN D DEFICIENCY: ICD-10-CM

## 2025-06-12 DIAGNOSIS — I95.2 HYPOTENSION DUE TO DRUGS: Primary | ICD-10-CM

## 2025-06-12 DIAGNOSIS — Z00.00 ANNUAL PHYSICAL EXAM: ICD-10-CM

## 2025-06-12 DIAGNOSIS — R73.09 ABNORMAL GLUCOSE: ICD-10-CM

## 2025-06-12 DIAGNOSIS — Z12.5 PROSTATE CANCER SCREENING: ICD-10-CM

## 2025-06-26 NOTE — PROGRESS NOTES
"Chief Complaint  Primary Care Follow-Up (1 month)    Subjective    History of Present Illness  The patient is a 65-year-old gentleman who presents for a 1-month follow-up.    The primary reason for this visit is to review his hypertension management. He has been managing his hypertension with enalapril 2.5 mg and Coreg 3.125 mg twice daily. He has chosen to discontinue the use of hydrochlorothiazide, which was previously prescribed at a dosage of 25 mg, due to consistently low blood pressure readings. His current medication regimen also includes atorvastatin 80 mg.    He reports that his dizziness has resolved following the acquisition of new glasses with prism lenses, which have been effective in maintaining pupil alignment.    He recently consulted a urologist for a prostate examination, which yielded normal results. The urologist plans to conduct an internal inspection of his urinary tract to investigate potential scarring.       Tripp Melchor presents to [facility]  Primary Care Follow-Up      Objective   Vital Signs:  BP 98/74 (BP Location: Left arm, Patient Position: Sitting, Cuff Size: Adult)   Pulse 84   Temp 98.1 °F (36.7 °C) (Oral)   Resp 16   Ht 182.9 cm (72.01\")   Wt 89.6 kg (197 lb 9.6 oz)   SpO2 96%   BMI 26.79 kg/m²   Estimated body mass index is 26.79 kg/m² as calculated from the following:    Height as of this encounter: 182.9 cm (72.01\").    Weight as of this encounter: 89.6 kg (197 lb 9.6 oz).            Physical Exam  Cardiovascular:      Rate and Rhythm: Normal rate.      Pulses: Normal pulses.      Heart sounds: Normal heart sounds.   Pulmonary:      Effort: Pulmonary effort is normal.      Breath sounds: Normal breath sounds.   Abdominal:      General: Bowel sounds are normal.      Palpations: Abdomen is soft.   Neurological:      Mental Status: He is alert and oriented to person, place, and time.        Result Review :  The following data was reviewed " by: Tyra Chacko MD on 06/12/2025:  CMP          12/4/2024    08:34 5/7/2025    13:05   CMP   Glucose 119  97    BUN 17  15    Creatinine 0.92  0.78    EGFR 93.5  100    Sodium 140  139    Potassium 5.3  4.1    Chloride 104  103    Calcium 9.7  9.4    Total Protein 6.7     Albumin 4.2     Globulin 2.5     Total Bilirubin 0.6     Alkaline Phosphatase 97     AST (SGOT) 19     ALT (SGPT) 22     Albumin/Globulin Ratio 1.7     BUN/Creatinine Ratio 18.5  19      CBC          12/4/2024    08:34   CBC   WBC 7.49    RBC 5.02    Hemoglobin 15.5    Hematocrit 46.6    MCV 92.8    MCH 30.9    MCHC 33.3    RDW 11.9    Platelets 273      Lipid Panel          12/4/2024    08:34   Lipid Panel   Total Cholesterol 161    Triglycerides 61    HDL Cholesterol 63    VLDL Cholesterol 12    LDL Cholesterol  86      TSH          12/4/2024    08:34   TSH   TSH 0.948                Assessment and Plan   Diagnoses and all orders for this visit:    1. Hypotension due to drugs (Primary)    2. Prostate cancer screening  -     PSA SCREENING; Future    3. Vitamin D deficiency  -     Vitamin D,25-Hydroxy; Future    4. Abnormal glucose  -     Hemoglobin A1c; Future    5. Annual physical exam  -     CBC Auto Differential; Future  -     Comprehensive Metabolic Panel; Future  -     Lipid Panel With / Chol / HDL Ratio; Future  -     TSH; Future  -     Urinalysis With Microscopic - Urine, Clean Catch; Future  -     Hemoglobin A1c; Future  -     Vitamin D,25-Hydroxy; Future  -     PSA SCREENING; Future        Assessment & Plan  1. Hypertension.  - Blood pressure readings have been consistently low, with a recent measurement of 92/58.  - Current blood pressure reading is 98/74.  - Discontinuation of hydrochlorothiazide is deemed appropriate.  - Continuation of enalapril 2.5 mg and Coreg 3.125 mg twice daily.    2. Dizziness.  - Reports no current symptoms of dizziness.  - Recent acquisition of new glasses with prism lenses has alleviated symptoms.  -  Previous dizziness may have been related to low blood pressure and eye issues.    3. Urinary issues.  - Urologist found prostate to be fine.  - Urologist plans to inspect the inside of the urinary tract for any scarring.  - Advised to follow up with the urologist as recommended.    4. Health maintenance.  - Comprehensive set of fasting labs will be ordered, including CBC, CMP, cholesterol, thyroid, urinalysis, A1c, vitamin D, and PSA screening.  - Follow-up scheduled in 6 months for a physical examination.            Follow Up   No follow-ups on file.  Patient was given instructions and counseling regarding his condition or for health maintenance advice. Please see specific information pulled into the AVS if appropriate.     Patient or patient representative verbalized consent for the use of Ambient Listening during the visit with  Tyra Chacko MD for chart documentation. 6/26/2025  14:08 EDT